# Patient Record
Sex: FEMALE | Race: WHITE | HISPANIC OR LATINO | Employment: FULL TIME | URBAN - METROPOLITAN AREA
[De-identification: names, ages, dates, MRNs, and addresses within clinical notes are randomized per-mention and may not be internally consistent; named-entity substitution may affect disease eponyms.]

---

## 2019-07-16 ENCOUNTER — HOSPITAL ENCOUNTER (OUTPATIENT)
Dept: RADIOLOGY | Facility: MEDICAL CENTER | Age: 24
End: 2019-07-16

## 2019-07-16 ENCOUNTER — HOSPITAL ENCOUNTER (INPATIENT)
Facility: MEDICAL CENTER | Age: 24
LOS: 5 days | DRG: 101 | End: 2019-07-21
Attending: EMERGENCY MEDICINE | Admitting: INTERNAL MEDICINE

## 2019-07-16 DIAGNOSIS — R51.9 ACUTE NONINTRACTABLE HEADACHE, UNSPECIFIED HEADACHE TYPE: ICD-10-CM

## 2019-07-16 DIAGNOSIS — R56.9 SEIZURE (HCC): ICD-10-CM

## 2019-07-16 PROBLEM — E87.20 LACTIC ACIDOSIS: Status: ACTIVE | Noted: 2019-07-16

## 2019-07-16 PROBLEM — E87.6 HYPOKALEMIA: Status: ACTIVE | Noted: 2019-07-16

## 2019-07-16 PROBLEM — D72.829 LEUKOCYTOSIS: Status: ACTIVE | Noted: 2019-07-16

## 2019-07-16 PROBLEM — N39.0 UTI (URINARY TRACT INFECTION): Status: ACTIVE | Noted: 2019-07-16

## 2019-07-16 LAB
ALBUMIN SERPL BCP-MCNC: 3.8 G/DL (ref 3.2–4.9)
ALBUMIN/GLOB SERPL: 1.2 G/DL
ALP SERPL-CCNC: 64 U/L (ref 30–99)
ALT SERPL-CCNC: 67 U/L (ref 2–50)
AMPHET UR QL SCN: NEGATIVE
ANION GAP SERPL CALC-SCNC: 10 MMOL/L (ref 0–11.9)
APPEARANCE UR: CLEAR
AST SERPL-CCNC: 42 U/L (ref 12–45)
BARBITURATES UR QL SCN: NEGATIVE
BASOPHILS # BLD AUTO: 0.3 % (ref 0–1.8)
BASOPHILS # BLD: 0.04 K/UL (ref 0–0.12)
BENZODIAZ UR QL SCN: NEGATIVE
BILIRUB SERPL-MCNC: 0.3 MG/DL (ref 0.1–1.5)
BILIRUB UR QL STRIP.AUTO: NEGATIVE
BUN SERPL-MCNC: 8 MG/DL (ref 8–22)
BZE UR QL SCN: NEGATIVE
CALCIUM SERPL-MCNC: 8 MG/DL (ref 8.5–10.5)
CANNABINOIDS UR QL SCN: NEGATIVE
CHLORIDE SERPL-SCNC: 108 MMOL/L (ref 96–112)
CO2 SERPL-SCNC: 18 MMOL/L (ref 20–33)
COLOR UR: YELLOW
CREAT SERPL-MCNC: 0.59 MG/DL (ref 0.5–1.4)
EOSINOPHIL # BLD AUTO: 0.01 K/UL (ref 0–0.51)
EOSINOPHIL NFR BLD: 0.1 % (ref 0–6.9)
ERYTHROCYTE [DISTWIDTH] IN BLOOD BY AUTOMATED COUNT: 39.5 FL (ref 35.9–50)
GLOBULIN SER CALC-MCNC: 3.2 G/DL (ref 1.9–3.5)
GLUCOSE SERPL-MCNC: 110 MG/DL (ref 65–99)
GLUCOSE UR STRIP.AUTO-MCNC: NEGATIVE MG/DL
HCG SERPL QL: NEGATIVE
HCT VFR BLD AUTO: 38.1 % (ref 37–47)
HGB BLD-MCNC: 12.8 G/DL (ref 12–16)
IMM GRANULOCYTES # BLD AUTO: 0.1 K/UL (ref 0–0.11)
IMM GRANULOCYTES NFR BLD AUTO: 0.6 % (ref 0–0.9)
KETONES UR STRIP.AUTO-MCNC: NEGATIVE MG/DL
LACTATE BLD-SCNC: 1 MMOL/L (ref 0.5–2)
LACTATE BLD-SCNC: 2.8 MMOL/L (ref 0.5–2)
LEUKOCYTE ESTERASE UR QL STRIP.AUTO: NEGATIVE
LYMPHOCYTES # BLD AUTO: 1.64 K/UL (ref 1–4.8)
LYMPHOCYTES NFR BLD: 10.3 % (ref 22–41)
MCH RBC QN AUTO: 28.1 PG (ref 27–33)
MCHC RBC AUTO-ENTMCNC: 33.6 G/DL (ref 33.6–35)
MCV RBC AUTO: 83.7 FL (ref 81.4–97.8)
METHADONE UR QL SCN: NEGATIVE
MICRO URNS: NORMAL
MONOCYTES # BLD AUTO: 0.89 K/UL (ref 0–0.85)
MONOCYTES NFR BLD AUTO: 5.6 % (ref 0–13.4)
NEUTROPHILS # BLD AUTO: 13.3 K/UL (ref 2–7.15)
NEUTROPHILS NFR BLD: 83.1 % (ref 44–72)
NITRITE UR QL STRIP.AUTO: NEGATIVE
NRBC # BLD AUTO: 0 K/UL
NRBC BLD-RTO: 0 /100 WBC
OPIATES UR QL SCN: NEGATIVE
OXYCODONE UR QL SCN: NEGATIVE
PCP UR QL SCN: NEGATIVE
PH UR STRIP.AUTO: 6.5 [PH]
PLATELET # BLD AUTO: 290 K/UL (ref 164–446)
PMV BLD AUTO: 10.7 FL (ref 9–12.9)
POTASSIUM SERPL-SCNC: 3.9 MMOL/L (ref 3.6–5.5)
PROPOXYPH UR QL SCN: NEGATIVE
PROT SERPL-MCNC: 7 G/DL (ref 6–8.2)
PROT UR QL STRIP: NEGATIVE MG/DL
RBC # BLD AUTO: 4.55 M/UL (ref 4.2–5.4)
RBC UR QL AUTO: NEGATIVE
SODIUM SERPL-SCNC: 136 MMOL/L (ref 135–145)
SP GR UR STRIP.AUTO: 1.01
UROBILINOGEN UR STRIP.AUTO-MCNC: 0.2 MG/DL
WBC # BLD AUTO: 16 K/UL (ref 4.8–10.8)

## 2019-07-16 PROCEDURE — A9270 NON-COVERED ITEM OR SERVICE: HCPCS | Performed by: INTERNAL MEDICINE

## 2019-07-16 PROCEDURE — 99255 IP/OBS CONSLTJ NEW/EST HI 80: CPT

## 2019-07-16 PROCEDURE — 700102 HCHG RX REV CODE 250 W/ 637 OVERRIDE(OP): Performed by: INTERNAL MEDICINE

## 2019-07-16 PROCEDURE — 83605 ASSAY OF LACTIC ACID: CPT | Mod: 91

## 2019-07-16 PROCEDURE — 700105 HCHG RX REV CODE 258: Performed by: INTERNAL MEDICINE

## 2019-07-16 PROCEDURE — 84703 CHORIONIC GONADOTROPIN ASSAY: CPT

## 2019-07-16 PROCEDURE — 99285 EMERGENCY DEPT VISIT HI MDM: CPT

## 2019-07-16 PROCEDURE — 80053 COMPREHEN METABOLIC PANEL: CPT

## 2019-07-16 PROCEDURE — 770020 HCHG ROOM/CARE - TELE (206)

## 2019-07-16 PROCEDURE — 87086 URINE CULTURE/COLONY COUNT: CPT

## 2019-07-16 PROCEDURE — 81003 URINALYSIS AUTO W/O SCOPE: CPT

## 2019-07-16 PROCEDURE — 87040 BLOOD CULTURE FOR BACTERIA: CPT | Mod: 91

## 2019-07-16 PROCEDURE — 99223 1ST HOSP IP/OBS HIGH 75: CPT | Performed by: INTERNAL MEDICINE

## 2019-07-16 PROCEDURE — 80307 DRUG TEST PRSMV CHEM ANLYZR: CPT

## 2019-07-16 PROCEDURE — 85025 COMPLETE CBC W/AUTO DIFF WBC: CPT

## 2019-07-16 RX ORDER — OXYCODONE HYDROCHLORIDE 5 MG/1
5 TABLET ORAL
Status: DISCONTINUED | OUTPATIENT
Start: 2019-07-16 | End: 2019-07-21 | Stop reason: HOSPADM

## 2019-07-16 RX ORDER — ACETAMINOPHEN 325 MG/1
650 TABLET ORAL EVERY 6 HOURS PRN
Status: DISCONTINUED | OUTPATIENT
Start: 2019-07-16 | End: 2019-07-21 | Stop reason: HOSPADM

## 2019-07-16 RX ORDER — SODIUM CHLORIDE 9 MG/ML
INJECTION, SOLUTION INTRAVENOUS CONTINUOUS
Status: DISCONTINUED | OUTPATIENT
Start: 2019-07-16 | End: 2019-07-19

## 2019-07-16 RX ORDER — PROMETHAZINE HYDROCHLORIDE 25 MG/1
12.5-25 TABLET ORAL EVERY 4 HOURS PRN
Status: DISCONTINUED | OUTPATIENT
Start: 2019-07-16 | End: 2019-07-21 | Stop reason: HOSPADM

## 2019-07-16 RX ORDER — BISACODYL 10 MG
10 SUPPOSITORY, RECTAL RECTAL
Status: DISCONTINUED | OUTPATIENT
Start: 2019-07-16 | End: 2019-07-21 | Stop reason: HOSPADM

## 2019-07-16 RX ORDER — ONDANSETRON 2 MG/ML
4 INJECTION INTRAMUSCULAR; INTRAVENOUS EVERY 4 HOURS PRN
Status: DISCONTINUED | OUTPATIENT
Start: 2019-07-16 | End: 2019-07-21 | Stop reason: HOSPADM

## 2019-07-16 RX ORDER — OXYCODONE HYDROCHLORIDE 5 MG/1
2.5 TABLET ORAL
Status: DISCONTINUED | OUTPATIENT
Start: 2019-07-16 | End: 2019-07-21 | Stop reason: HOSPADM

## 2019-07-16 RX ORDER — LEVETIRACETAM 500 MG/1
500 TABLET ORAL 2 TIMES DAILY
Status: DISCONTINUED | OUTPATIENT
Start: 2019-07-16 | End: 2019-07-21 | Stop reason: HOSPADM

## 2019-07-16 RX ORDER — PROMETHAZINE HYDROCHLORIDE 25 MG/1
12.5-25 SUPPOSITORY RECTAL EVERY 4 HOURS PRN
Status: DISCONTINUED | OUTPATIENT
Start: 2019-07-16 | End: 2019-07-21 | Stop reason: HOSPADM

## 2019-07-16 RX ORDER — AMOXICILLIN 250 MG
2 CAPSULE ORAL 2 TIMES DAILY
Status: DISCONTINUED | OUTPATIENT
Start: 2019-07-16 | End: 2019-07-21 | Stop reason: HOSPADM

## 2019-07-16 RX ORDER — ONDANSETRON 4 MG/1
4 TABLET, ORALLY DISINTEGRATING ORAL EVERY 4 HOURS PRN
Status: DISCONTINUED | OUTPATIENT
Start: 2019-07-16 | End: 2019-07-21 | Stop reason: HOSPADM

## 2019-07-16 RX ORDER — MORPHINE SULFATE 4 MG/ML
2 INJECTION, SOLUTION INTRAMUSCULAR; INTRAVENOUS
Status: DISCONTINUED | OUTPATIENT
Start: 2019-07-16 | End: 2019-07-21 | Stop reason: HOSPADM

## 2019-07-16 RX ORDER — POLYETHYLENE GLYCOL 3350 17 G/17G
1 POWDER, FOR SOLUTION ORAL
Status: DISCONTINUED | OUTPATIENT
Start: 2019-07-16 | End: 2019-07-21 | Stop reason: HOSPADM

## 2019-07-16 RX ADMIN — SENNOSIDES, DOCUSATE SODIUM 2 TABLET: 50; 8.6 TABLET, FILM COATED ORAL at 21:42

## 2019-07-16 RX ADMIN — LEVETIRACETAM 500 MG: 500 TABLET ORAL at 19:32

## 2019-07-16 RX ADMIN — SODIUM CHLORIDE: 9 INJECTION, SOLUTION INTRAVENOUS at 17:40

## 2019-07-16 RX ADMIN — SODIUM CHLORIDE: 9 INJECTION, SOLUTION INTRAVENOUS at 22:00

## 2019-07-16 ASSESSMENT — PATIENT HEALTH QUESTIONNAIRE - PHQ9
SUM OF ALL RESPONSES TO PHQ9 QUESTIONS 1 AND 2: 0
2. FEELING DOWN, DEPRESSED, IRRITABLE, OR HOPELESS: NOT AT ALL
1. LITTLE INTEREST OR PLEASURE IN DOING THINGS: NOT AT ALL

## 2019-07-16 ASSESSMENT — COGNITIVE AND FUNCTIONAL STATUS - GENERAL
PERSONAL GROOMING: A LITTLE
WALKING IN HOSPITAL ROOM: A LITTLE
SUGGESTED CMS G CODE MODIFIER MOBILITY: CK
DAILY ACTIVITIY SCORE: 18
EATING MEALS: A LITTLE
TOILETING: A LITTLE
HELP NEEDED FOR BATHING: A LITTLE
MOBILITY SCORE: 19
CLIMB 3 TO 5 STEPS WITH RAILING: A LITTLE
TURNING FROM BACK TO SIDE WHILE IN FLAT BAD: A LITTLE
DRESSING REGULAR UPPER BODY CLOTHING: A LITTLE
MOVING TO AND FROM BED TO CHAIR: A LITTLE
MOVING FROM LYING ON BACK TO SITTING ON SIDE OF FLAT BED: A LITTLE
SUGGESTED CMS G CODE MODIFIER DAILY ACTIVITY: CK
DRESSING REGULAR LOWER BODY CLOTHING: A LITTLE

## 2019-07-16 ASSESSMENT — ENCOUNTER SYMPTOMS
NERVOUS/ANXIOUS: 0
SENSORY CHANGE: 0
DIZZINESS: 0
CHILLS: 0
INSOMNIA: 0
SEIZURES: 1
CLAUDICATION: 0
PHOTOPHOBIA: 0
SHORTNESS OF BREATH: 0
MYALGIAS: 0
ABDOMINAL PAIN: 0
WEAKNESS: 0
DIARRHEA: 0
VOMITING: 0
NAUSEA: 0
HEARTBURN: 0
SORE THROAT: 0
LOSS OF CONSCIOUSNESS: 1
SPEECH CHANGE: 0
CONSTIPATION: 0
COUGH: 0
BLURRED VISION: 0
DEPRESSION: 0
HEADACHES: 1
FEVER: 0

## 2019-07-16 ASSESSMENT — LIFESTYLE VARIABLES
DO YOU DRINK ALCOHOL: NO
EVER_SMOKED: NEVER

## 2019-07-16 NOTE — ED PROVIDER NOTES
"ED Provider Note    CHIEF COMPLAINT  Chief Complaint   Patient presents with   • Seizure     Patient witnessed to have seizure at home today.  Taken to [place] where patient has additional seizure.   Hx of seizures \"years ago\".  Transferred for Neuro consult.           HPI  Rosanna Cid is a 24 y.o. female with a history of seizure disorder who presents on transfer from [facility] with complaints of headache and seizure.  The patient apparently has been having a headache for the past 5 days that have been very severe.  She describes a headache as bitemporal in nature, with throbbing and pressure.  She did have associated visual symptoms including blurry vision, sparkling lights, and tunnel vision.  She was seen in the emergency department in [place] yesterday and was given some medication with improvement of her symptoms.  Earlier this morning, the patient needed to have a headache, and had seizure-like activity and appeared to be postictal after.  She was taken to the outside hospital where he she was complaining of a severe headache.  CT scan head without contrast showed no acute findings.  She had a witnessed tonic clonic seizure lasting for about 1 minute.  She was given Ativan and loaded with Keppra.  Because of her headaches and seizure, a lumbar puncture was performed with results pending.  Her white blood cell count was elevated at 34,000, with a lactic acid of 6.2.  The patient was given 2 g of Rocephin, and 800 mg of acyclovir.  She was transferred to this facility for further care.  Apparently the patient had a similar episode in 2007 where she was having a severe headache and had a seizure.  She had an MRI which was unremarkable per the outside physician.  She has had no further seizures since that initial episode, until today.    REVIEW OF SYSTEMS  See HPI for further details. All other systems are negative.     PAST MEDICAL HISTORY  Past Medical History:   Diagnosis Date   • Seizure (HCC)  " "   last  one was 2 years ago, not on medication   patient denies seizure 2 years ago    FAMILY HISTORY  No family history on file.    SOCIAL HISTORY  Social History     Social History   • Marital status: Single     Spouse name: N/A   • Number of children: N/A   • Years of education: N/A     Social History Main Topics   • Smoking status: Never Smoker   • Smokeless tobacco: Not on file   • Alcohol use No   • Drug use: No   • Sexual activity: Not Currently     Partners: Male     Other Topics Concern   • Not on file     Social History Narrative   • No narrative on file       SURGICAL HISTORY  No past surgical history on file.    CURRENT MEDICATIONS  Home Medications     Reviewed by Kareen Henning R.N. (Registered Nurse) on 07/16/19 at 1458  Med List Status: Partial   Medication Last Dose Status   ferrous sulfate 325 (65 FE) MG EC tablet  Active                ALLERGIES  Allergies   Allergen Reactions   • Nkda [No Known Drug Allergy]        PHYSICAL EXAM  VITAL SIGNS: /67   Pulse (!) 102   Temp 36.9 °C (98.4 °F) (Temporal)   Resp 12   Ht 1.676 m (5' 6\")   Wt 86.2 kg (190 lb)   SpO2 99%   BMI 30.67 kg/m²   Constitutional: Awake, alert, mildly sedated appearing, in no acute distress, Non-toxic appearance.   HENT: Atraumatic. Bilateral external ears normal, mucous membranes mildly dry, throat nonerythematous without exudates, nose is normal.  Eyes: PERRL, EOMI, conjunctiva moist, noninjected.  Neck: Nontender, Normal range of motion, No nuchal rigidity, No stridor.   Lymphatic: No lymphadenopathy noted.   Cardiovascular: Regular rate and rhythm, no murmurs, rubs, gallops.  Thorax & Lungs:  Good breath sounds bilaterally, no wheezes, rales, or retractions.  No chest tenderness.  Abdomen: Bowel sounds normal, Soft, nontender, nondistended, no rebound, guarding, masses.  Back: No CVA or spinal tenderness.  Extremities: Intact distal pulses, No edema, No tenderness.   Skin: Warm, Dry, No rashes. "   Musculoskeletal: No joint swelling or tenderness.  Neurologic: Alert & oriented x 3, cranial nerves II through XII appear intact, speech is fluent, no facial asymmetry, sensory intact to light touch, and motor function shows 5/5 strength to the upper and lower extremities, no focal deficits.   Psychiatric: Affect normal, Judgment normal, Mood normal.       RADIOLOGY/PROCEDURES  OUTSIDE IMAGES-DX CHEST   Final Result      OUTSIDE IMAGES-CT HEAD   Final Result      MR-BRAIN-W/O    (Results Pending)       COURSE & MEDICAL DECISION MAKING  Pertinent Labs & Imaging studies reviewed. (See chart for details)  The patient presents with the above complaints.  She is currently awake, slightly sedated appearing, appears neurologically intact.  The patient complains of a mild headache, but declines any further pain medication.  Work-up from the outside hospital was reviewed.  The patient was noted to have an elevated white count and low CO2 and elevated lactic acid likely secondary to the seizure.  She did have a lumbar puncture and spinal fluid shows no signs of infection with 0 WBC, and normal glucose and protein.  Patient received IV fluids at the outside facility.  Repeat lab work here shows a white count of 16,000, 83% polys, CO2 of 18, anion gap 10, calcium 8, SGOT 42, SGPT 67, total bilirubin 0.3, lactic acid improved to 2.8.  hCG was negative.  Case was discussed with Dr. Macedo for admission.  Dr. Paz of neurology was consulted and will see the patient.        FINAL IMPRESSION  1.  Seizure  2.  Acute headache  3.         Electronically signed by: Alex Orozco, 7/16/2019 3:05 PM

## 2019-07-16 NOTE — H&P
Hospital Medicine History & Physical Note    Date of Service  7/16/2019    Primary Care Physician  Pcp Pt States None    Consultants  Neurology consult pending    Code Status  full    Chief Complaint  Seizure at OSH while presenting for uncontrolled headache    History of Presenting Illness  24 y.o. female who presented 7/16/2019 with severe, uncontrolled headache to [facility].  While being worked up in the ED she had tonic-clonic activity that lasted approximately 1 minute that resolved prior to receiving 1g Keppra and 1g Ativan.  She was transferred here for neurology consultation.  She had an LP prior to transfer but unfortunately the results of this test did not accompany the patient and attempts are underway to get this information.  Patient states same thing happened in 2007 and she states she never received a diagnosis at that time but hadn't had any issues with this until now.  Labs at OSH are consistent with acute reaction with WBC 34, K 2.8, CO2 9, lactic acid 6.2 and UA with 1+ leukocyte esterase, 2+ bacteria and negative nitrate.  Patient is alert and oriented at time of examination but sleepy.  CT head is without abnormality other than a small calcified lesion.    Review of Systems  Review of Systems   Constitutional: Positive for malaise/fatigue. Negative for chills and fever.   HENT: Negative for congestion and sore throat.    Eyes: Negative for blurred vision and photophobia.   Respiratory: Negative for cough and shortness of breath.    Cardiovascular: Negative for chest pain, claudication and leg swelling.   Gastrointestinal: Negative for abdominal pain, constipation, diarrhea, heartburn, nausea and vomiting.   Genitourinary: Positive for urgency. Negative for dysuria and hematuria.   Musculoskeletal: Negative for joint pain and myalgias.   Skin: Negative for itching and rash.   Neurological: Positive for seizures, loss of consciousness and headaches. Negative for dizziness, sensory change,  speech change and weakness.   Psychiatric/Behavioral: Negative for depression. The patient is not nervous/anxious and does not have insomnia.        Past Medical History   has a past medical history of Seizure ([facility]). She also has no past medical history of Addisons disease ([facility]); Adrenal disorder ([facility]); Allergy; Anemia; Anxiety; Arrhythmia; Arthritis; ASTHMA; Blood transfusion; Cancer ([facility]); CATARACT; CHF (congestive heart failure) ([facility]); Clotting disorder ([facility]); COPD; Cushings syndrome ([facility]); Depression; Diabetes; Diabetic neuropathy ([facility]); EMPHYSEMA; GERD (gastroesophageal reflux disease); Glaucoma; Goiter; Headache(784.0); Heart attack (HCC); Heart murmur; HIV (human immunodeficiency virus infection); Hyperlipidemia; Hypertension; IBD (inflammatory bowel disease); Kidney disease; Meningitis; Migraine; Muscle disorder; OSTEOPOROSIS; Parathyroid disorder (HCC); Pituitary disease (HCC); Sickle cell disease (HCC); Stroke ([facility]); Substance abuse ([facility]); Thyroid disease; Tuberculosis; Ulcer; or Urinary tract infection, site not specified.    Surgical History   has no past surgical history on file.   Denies previous surgeries    Family History  family history is not on file.   Denies significant family history    Social History   reports that she has never smoked. She does not have any smokeless tobacco history on file. She reports that she does not drink alcohol or use drugs.    Allergies  Allergies   Allergen Reactions   • Nkda [No Known Drug Allergy]        Medications  Prior to Admission Medications   Prescriptions Last Dose Informant Patient Reported? Taking?   asa/apap/caffeine (EXCEDRIN) 250-250-65 MG Tab 7/15/2019 at PRN Patient Yes Yes   Sig: Take 1 Tab by mouth every 6 hours as needed for Headache.      Facility-Administered Medications: None       Physical Exam  Temp:  [36.9 °C (98.4 °F)] 36.9 °C (98.4 °F)  Pulse:  [102] 102  Resp:  [12] 12  BP: (117)/(67) 117/67  SpO2:  [99 %] 99 %    Physical Exam    Constitutional: She is oriented to person, place, and time. She appears well-developed and well-nourished. No distress.   HENT:   Head: Normocephalic and atraumatic.   Eyes: Conjunctivae are normal. No scleral icterus.   Neck: Neck supple. No JVD present.   Cardiovascular: Normal rate, regular rhythm and normal heart sounds.  Exam reveals no gallop and no friction rub.    No murmur heard.  Pulmonary/Chest: Effort normal and breath sounds normal. No respiratory distress. She exhibits no tenderness.   Abdominal: Soft. Bowel sounds are normal. She exhibits no distension. There is no guarding.   Musculoskeletal: She exhibits no edema or tenderness.   Lymphadenopathy:     She has no cervical adenopathy.   Neurological: She is alert and oriented to person, place, and time. No cranial nerve deficit.   Skin: Skin is warm and dry. She is not diaphoretic. No erythema. No pallor.   Psychiatric: She has a normal mood and affect. Her behavior is normal.   Nursing note and vitals reviewed.      Laboratory:  Recent Labs      07/16/19   1507   WBC  16.0*   RBC  4.55   HEMOGLOBIN  12.8   HEMATOCRIT  38.1   MCV  83.7   MCH  28.1   MCHC  33.6   RDW  39.5   PLATELETCT  290   MPV  10.7         Recent Labs      07/16/19   1507   ALTSGPT  67*   ASTSGOT  42   ALKPHOSPHAT  64   TBILIRUBIN  0.3   GLUCOSE  110*                 No results for input(s): TROPONINI in the last 72 hours.    Urinalysis:    No results found     Imaging:  OUTSIDE IMAGES-DX CHEST   Final Result      OUTSIDE IMAGES-CT HEAD   Final Result      MR-BRAIN-W/O    (Results Pending)         Assessment/Plan:  I anticipate this patient will require at least two midnights for appropriate medical management, necessitating inpatient admission.    * Seizure (HCC)   Assessment & Plan    Neurology consulted by ERP - Brandi  Loaded keppra at OSH along with rocephin - continue oral at 500mg bid  Ativan PRN seizure, seizure precautions  LP completed at OSH - results received, no  evidence of infection  Continue empiric rocephin, add vanco           Leukocytosis   Assessment & Plan    Secondary to seizure and UTI  Empiric antibiotics.       Hypokalemia   Assessment & Plan    2.8 at OSH  Replete as needed       Lactic acidosis   Assessment & Plan    Secondary to seizure and dehydration  Improved from 6.2 at OSH to 2.8 here  Continue hydration       UTI (urinary tract infection)   Assessment & Plan    Rocephin       Headache   Assessment & Plan    Preceded seizure by 2 days  Suspect seizure-like activity could be complex migraine  PRN pain medication           VTE prophylaxis: scds

## 2019-07-16 NOTE — ED TRIAGE NOTES
"Chief Complaint   Patient presents with   • Seizure     Patient witnessed to have seizure at home today.  Taken to [place] where patient has additional seizure.   Hx of seizures \"years ago\".  Transferred for Neuro consult.           "

## 2019-07-16 NOTE — CONSULTS
Consulted for headache and multiple seizures by ED Dr Tamara Johnson     HPI:  25 yo woman with acute onset left sided throbbing headache associated with sensitivity to light and sound which started last week on a Tuesday ( 1 week ago). She had daily headaches which were preventing her from sleeping and waking her up from sleep. She used to have similar headaches when she was younger however never multiple episodes all day every day. She was taking OTC acetaminophen and paracetamol to relieve 10/10 pain however this was very brief and pain would come back. She reports no vision loss, no blurry vision or diplopia, no fevers, night sweats and no weight loss or sick contacts.  Family reports no odd behavior, no hallucinations or delusions.  She suddenly developed GTC x 2 yesterday during the day and she had additional 2 or 3 episodes today one while in the outside hospital where she received Ativan and a load of LEV. GTC seizures were full body convulsions with lateral tongue bite and postictal confusion lasting 15-20 minutes.  She had LP at [facility] and was immmediately transferred here because of ongoing seizure activity.  Family and patient tell me that she has exact similar episode with one seizure in 2008 before which she also had headache.  She is currently fatigued and slightly postictal however back to baseline and answering questions and following all commands.  She had no seizures in her childhood and there is no family history of either seizures or migraines.  There were no sick contacts, rashes, travels out of the country recently.  LP results from [facility] are pending at this time.  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  Her brother and parents are with her today.  Apparently no PMH and takes no medications.    ROS:   Constitutional: No fevers or chills.  Eyes: No  "blurry vision or eye pain.  ENT: No dysphagia or hearing loss.  Respiratory: No cough or shortness of breath.  Cardiovascular: No chest pain or palpitations.  GI: No nausea, vomiting, or diarrhea.  : No urinary incontinence or dysuria.  Musculoskeletal: No joint swelling or arthralgias.  Skin: No skin rashes.  Neuro: as above + seizures + headache  Endocrine: No heat or cold intolerance. No polydipsia or polyuria.  Psych: No depression or anxiety.  Heme/Lymph: No easy bruising or swollen lymph nodes.      Past Medical History:  Past Medical History:   Diagnosis Date   • Seizure (HCC)     last  one was 2 years ago, not on medication       Past Surgical History:   No past surgical history on file.    Social History:   Social History     Social History   • Marital status: Single     Spouse name: N/A   • Number of children: N/A   • Years of education: N/A     Occupational History   • Not on file.     Social History Main Topics   • Smoking status: Never Smoker   • Smokeless tobacco: Not on file   • Alcohol use No   • Drug use: No   • Sexual activity: Not Currently     Partners: Male     Other Topics Concern   • Not on file     Social History Narrative   • No narrative on file       Family History:   No family history on file.  No significant FH of neurological disorders.    Allergies:   Nkda [no known drug allergy]    No current facility-administered medications on file prior to encounter.      No current outpatient prescriptions on file prior to encounter.     Physical Exam:     Encounter Vitals  Standard Vitals  Vitals  Blood Pressure: 117/67  Temperature: 36.9 °C (98.4 °F)  Temp src: Temporal  Pulse: (!) 102  Respiration: 12  Pulse Oximetry: 99 %  Height: 167.6 cm (5' 6\")  Weight: 86.2 kg (190 lb)  Encounter Vitals  Temperature: 36.9 °C (98.4 °F)  Temp src: Temporal  Blood Pressure: 117/67  BP Location: Right  Patient BP Position: Supine  Pulse: (!) 102  Respiration: 12  Pulse Oximetry: 99 %  O2 (LPM): 2  O2 " "Delivery: Nasal Cannula  Weight: 86.2 kg (190 lb)  Weight Source: Other Healthcare Provider  Height: 167.6 cm (5' 6\")  BMI (Calculated): 30.67  Pulmonary-Specific Vitals     Durable Medical Equipment-Specific Vitals  DME  O2 (LPM): 2  O2 Delivery: Nasal Cannula      Constitutional: Well-developed, well-nourished, good hygiene. Appears stated age.  Cardiovascular: RRR, with no murmurs, rubs or gallops. No carotid bruits.   Respiratory: Lungs CTA B/L, no W/R/R.   Abdomen: Soft Non-tender to Palpation. Non-distended.  Extremities: No peripheral edema, pedal pulses intact.   Skin: Warm, dry, intact. No rashes observed.  Eyes: Sclera anicteric   Funduscopic: Optic discs flat with no evidence of papilledema or pallor.   Neurologic:   Mental Status: somnolent but opens eyes to voice and follows all commands    Speech: Fluent with normal prosody..    Concentration: Attentive. Able to focus on history and follow multi-step commands.              Fund of Knowledge: Appropriate   Cranial Nerves:    CN II: PERRL. No afferent pupillary defect.    CN III, IV, VI: Good eye closure, EOMI.     CN V: Facial sensation intact and symmetric.     CN VII: No facial asymmetry.     CN VIII: Hearing intact.     CN IX and X: Palate elevates symmetrically. Normal gag reflex.    CN XI: Symmetric shoulder shrug.     CN XII: Tongue midline.   Neck - moving freely no stiffness  Kernig and brudzinski negative    Sensory: Intact light touch, vibration and temperature.    Coordination: No evidence of past-pointing on finger to nose testing, no dysdiadochokinesia. Heel to shin intact.             DTR's: 2+ throughout without clonus.    Babinski: Toes downgoing bilaterally.   Romberg: Negative.   Movements: No tremors observed.   Musculoskeletal:    Strength: 5/5 in upper and lower extremities bilaterally.   Gait: deferred, patient postictal    Tone: Normal bulk and tone.   Joints: No swelling.     Labs:  Pending   Pending CSF labs from OSH   Pending " UA and UDS    Imaging:   CT head NAF  MRI brain 2008- unremarkable       Impression and plan  Migraine headaches followed  By multiple seizures   Unlikely familial hemiplegic migraines as presentation not typical   Migralepsy?  Can nor r/o CNS infection such as infectious meningitis or encephalitis   Viral infection would be more likely as she has no features of bacterial meningitis and does not appear toxic   Had similar episode in 2008 which makes infection less likely as a cause   Autoimmune encephalitis is a possibility however she lacks behavioral/cognitive symptoms     Plan:  MRI brain with and without contrast to r/o structural lesion   cEEG with video monitoring    mg bid   Ativan 1-2 mg prn if any further GTCs  Obtain CSF results - need OP,CP, protein,glucose, OCB,MBP, viral panel PCR EBV,ESV,VZV,enteroviruses EEE WEE  Lyme  ACE  Would send ESR,CRP,DIONE, RF, APS  Infectious workup including UA CXR blood cultures etc  Magnesium sulfate 1 g /24h IV  Symptomatic management of migraines- toradol IM or IV 60 mg q8h prn for 1-2 days only  Empiric coverage for HSV encephalitis   Add autoimmune encephalitis panel to CSF if possible including NMDA and VGKC  Given one episode in 2008 already doubt paraneoplastic etiology of her current symptoms   Consider preventative migraine treatment such as Topiramate 25 mg qweek and increase by 25q week until 100 mg bid or until sx better controlled  B2 400 mg daily Mag oxide 500 mg daily at home   Can try triptans as abortive migraine medication   Seizure precautions   No driving for 3 months after seizure    Thank you for this consult. Neurology will continue to follow.    Asia Paz MD PhD   Board certified neurologist   Behavioral Neurologist

## 2019-07-17 ENCOUNTER — APPOINTMENT (OUTPATIENT)
Dept: RADIOLOGY | Facility: MEDICAL CENTER | Age: 24
DRG: 101 | End: 2019-07-17

## 2019-07-17 PROBLEM — R82.81 PYURIA: Status: ACTIVE | Noted: 2019-07-16

## 2019-07-17 LAB
ALBUMIN SERPL BCP-MCNC: 3.7 G/DL (ref 3.2–4.9)
ALBUMIN/GLOB SERPL: 1.3 G/DL
ALP SERPL-CCNC: 55 U/L (ref 30–99)
ALT SERPL-CCNC: 60 U/L (ref 2–50)
ANION GAP SERPL CALC-SCNC: 9 MMOL/L (ref 0–11.9)
AST SERPL-CCNC: 37 U/L (ref 12–45)
BILIRUB SERPL-MCNC: 0.5 MG/DL (ref 0.1–1.5)
BUN SERPL-MCNC: 6 MG/DL (ref 8–22)
CALCIUM SERPL-MCNC: 8.4 MG/DL (ref 8.5–10.5)
CHLORIDE SERPL-SCNC: 108 MMOL/L (ref 96–112)
CO2 SERPL-SCNC: 22 MMOL/L (ref 20–33)
CREAT SERPL-MCNC: 0.63 MG/DL (ref 0.5–1.4)
EKG IMPRESSION: NORMAL
ERYTHROCYTE [DISTWIDTH] IN BLOOD BY AUTOMATED COUNT: 41 FL (ref 35.9–50)
GLOBULIN SER CALC-MCNC: 2.8 G/DL (ref 1.9–3.5)
GLUCOSE SERPL-MCNC: 90 MG/DL (ref 65–99)
HCT VFR BLD AUTO: 36 % (ref 37–47)
HGB BLD-MCNC: 11.8 G/DL (ref 12–16)
MCH RBC QN AUTO: 28.2 PG (ref 27–33)
MCHC RBC AUTO-ENTMCNC: 32.8 G/DL (ref 33.6–35)
MCV RBC AUTO: 85.9 FL (ref 81.4–97.8)
PLATELET # BLD AUTO: 256 K/UL (ref 164–446)
PMV BLD AUTO: 11 FL (ref 9–12.9)
POTASSIUM SERPL-SCNC: 3.6 MMOL/L (ref 3.6–5.5)
PROT SERPL-MCNC: 6.5 G/DL (ref 6–8.2)
RBC # BLD AUTO: 4.19 M/UL (ref 4.2–5.4)
SODIUM SERPL-SCNC: 139 MMOL/L (ref 135–145)
WBC # BLD AUTO: 9.7 K/UL (ref 4.8–10.8)

## 2019-07-17 PROCEDURE — 93005 ELECTROCARDIOGRAM TRACING: CPT | Performed by: FAMILY MEDICINE

## 2019-07-17 PROCEDURE — 93010 ELECTROCARDIOGRAM REPORT: CPT | Performed by: INTERNAL MEDICINE

## 2019-07-17 PROCEDURE — 80053 COMPREHEN METABOLIC PANEL: CPT

## 2019-07-17 PROCEDURE — 770020 HCHG ROOM/CARE - TELE (206)

## 2019-07-17 PROCEDURE — 95951 EEG: CPT | Mod: 26,52 | Performed by: PSYCHIATRY & NEUROLOGY

## 2019-07-17 PROCEDURE — 700111 HCHG RX REV CODE 636 W/ 250 OVERRIDE (IP): Performed by: INTERNAL MEDICINE

## 2019-07-17 PROCEDURE — A9585 GADOBUTROL INJECTION: HCPCS

## 2019-07-17 PROCEDURE — 85027 COMPLETE CBC AUTOMATED: CPT

## 2019-07-17 PROCEDURE — 95951 EEG: CPT | Mod: 52 | Performed by: PSYCHIATRY & NEUROLOGY

## 2019-07-17 PROCEDURE — 99232 SBSQ HOSP IP/OBS MODERATE 35: CPT | Mod: 25

## 2019-07-17 PROCEDURE — 99232 SBSQ HOSP IP/OBS MODERATE 35: CPT | Performed by: FAMILY MEDICINE

## 2019-07-17 PROCEDURE — 700117 HCHG RX CONTRAST REV CODE 255

## 2019-07-17 PROCEDURE — A9270 NON-COVERED ITEM OR SERVICE: HCPCS | Performed by: INTERNAL MEDICINE

## 2019-07-17 PROCEDURE — 700102 HCHG RX REV CODE 250 W/ 637 OVERRIDE(OP): Performed by: INTERNAL MEDICINE

## 2019-07-17 PROCEDURE — 700105 HCHG RX REV CODE 258: Performed by: INTERNAL MEDICINE

## 2019-07-17 PROCEDURE — 70553 MRI BRAIN STEM W/O & W/DYE: CPT

## 2019-07-17 PROCEDURE — 36415 COLL VENOUS BLD VENIPUNCTURE: CPT

## 2019-07-17 PROCEDURE — 4A10X4Z MONITORING OF CENTRAL NERVOUS ELECTRICAL ACTIVITY, EXTERNAL APPROACH: ICD-10-PCS | Performed by: PSYCHIATRY & NEUROLOGY

## 2019-07-17 RX ORDER — GADOBUTROL 604.72 MG/ML
10 INJECTION INTRAVENOUS ONCE
Status: COMPLETED | OUTPATIENT
Start: 2019-07-17 | End: 2019-07-17

## 2019-07-17 RX ADMIN — GADOBUTROL 10 ML: 604.72 INJECTION INTRAVENOUS at 23:03

## 2019-07-17 RX ADMIN — SENNOSIDES, DOCUSATE SODIUM 2 TABLET: 50; 8.6 TABLET, FILM COATED ORAL at 04:54

## 2019-07-17 RX ADMIN — LEVETIRACETAM 500 MG: 500 TABLET ORAL at 16:35

## 2019-07-17 RX ADMIN — SODIUM CHLORIDE: 9 INJECTION, SOLUTION INTRAVENOUS at 19:09

## 2019-07-17 RX ADMIN — CEFTRIAXONE SODIUM 1 G: 1 INJECTION, POWDER, FOR SOLUTION INTRAMUSCULAR; INTRAVENOUS at 04:54

## 2019-07-17 RX ADMIN — SODIUM CHLORIDE: 9 INJECTION, SOLUTION INTRAVENOUS at 05:00

## 2019-07-17 RX ADMIN — LEVETIRACETAM 500 MG: 500 TABLET ORAL at 04:54

## 2019-07-17 ASSESSMENT — ENCOUNTER SYMPTOMS
VOMITING: 0
DIZZINESS: 0
SEIZURES: 0
NERVOUS/ANXIOUS: 0
COUGH: 0
FLANK PAIN: 0
ABDOMINAL PAIN: 0
WHEEZING: 0
DIARRHEA: 0
CHILLS: 0
SPEECH CHANGE: 0
SENSORY CHANGE: 0
SHORTNESS OF BREATH: 0
FOCAL WEAKNESS: 0
HEARTBURN: 0
NECK PAIN: 0
WEAKNESS: 0
BLURRED VISION: 0
HEADACHES: 0
PALPITATIONS: 0
BACK PAIN: 0
FEVER: 0
SORE THROAT: 0
NAUSEA: 0

## 2019-07-17 NOTE — CARE PLAN
Problem: Safety  Goal: Will remain free from injury  Outcome: PROGRESSING AS EXPECTED  Seizure precautions in place. Call light within reach.

## 2019-07-17 NOTE — ED NOTES
Attempted to call report on pt to neuro surgery. Per  RN would be candie. Attempted to transfer me to his extension x2 with no answer. Pt went transported to the floor.

## 2019-07-17 NOTE — PROGRESS NOTES
Pt admitted from ED. She is alert and oriented times four. Denies pain or needs at this time. Admission protocols completed. Seizure precautions implemented. Plan of care reviewed with pt and family, they voiced understanding. Call light and personal items within reach. Will monitor closely.    0215am Per tele technician, pt has had two pauses 1.2 secs and 1.4 secs. Pt was found to be asymptomatic on both occassions. Vitals normal, will monitor.    0600am: No acute events overnight. Has been seizure free since arrival to the floor. She is                ambulating, tolerating diet and cooperative with cares. Will monitor.    0620 am : Monitor Summary: Sinus iesha, Sinus rhythm. HR 47 to 81. MT .12, QRS .4, had two pauses lasting 1.2 secs and 1,4 secs respectively.

## 2019-07-17 NOTE — ED NOTES
Assumed care of pt at this time. Pt waiting for bed upstairs. Denies any needs at this time. Family at bedside. Call light within reach.

## 2019-07-17 NOTE — PROCEDURES
VIDEO ELECTROENCEPHALOGRAM REPORT      Referring provider: Dr. Paz.     DOS: 7/17/2019 (total recording of 28 minutes).     INDICATION:  Rosanna Cid 24 y.o. female presenting with headache and seizures.    CURRENT ANTIEPILEPTIC REGIMEN: Levetiracetam 500 mg twice daily.    TECHNIQUE: 30 channel video electroencephalogram (EEG) was performed in accordance with the international 10-20 system. The study was reviewed in bipolar and referential montages. The recording examined the patient during wakeful and drowsy/sleep state(s).     DESCRIPTION OF THE RECORD:  During the wakefulness, the background showed a symmetrical 10 hz alpha activity posteriorly with amplitude of 70 mV.  There was reactivity to eye closure/opening.  A normal anterior-posterior gradient was noted with faster beta frequencies seen anteriorly.  During drowsiness, theta/delta frequencies were seen.    During the sleep state, background shows diffuse high-amplitude 4-5 Hz delta activity.  Symmetrical high-amplitude sleep spindles and vertex sharps were seen in the leads over the central regions.     ACTIVATION PROCEDURES:   Hyperventilation was performed by the patient for a total of 3 minutes. The technician performing the test noted good effort. This technique failed to produce electroencephalographic changes.     Intermittent Photic stimulation was performed in a stepwise fashion from 1 to 30 Hz and elicited a normal response (photic driving), most noticeable in the posterior leads.      ICTAL AND/OR INTERICTAL FINDINGS:   Intermittent left temporal slowing, and rare left temporal sharps noted.  No clinical events or seizures were reported or recorded during the study.     EKG: sampling of the EKG recording demonstrated sinus rhythm.     EVENTS: None.    INTERPRETATION:  This is an abnormal video EEG recording in the awake, drowsy, sleep states. Intermittent left temporal slowing, and rare left temporal sharps noted.  The findings increase  risk for seizures, and suggests underlying area of cortical irritability and structural abnormality.  No seizures were captured during the study.  Clinical and radiological correlation is recommended.      Tai Rabago MD   Epilepsy and Neurodiagnostics.   Clinical  of Neurology [facility].   Diplomate in Neurology, Epilepsy, and Electrodiagnostic Medicine.   Office: [phone]  Fax: [phone]

## 2019-07-17 NOTE — PROGRESS NOTES
Neurology Daily Progress Note    Date of Service  7/17/2019    Chief Complaint  24 y.o. female admitted 7/16/2019 with headaches of migrainous character and multiple seizures.  She is currently seizure and migraine free since admitting to [facility] and doing well.Able to follow all commands and respond appropriately.    Hospital Course  As above     Interval Problem Update  Seizure free and headache free since arriving in ED    Code Status  Full code     Disposition  Floor status     Review of Systems  ROS   No headache  Fatigue+  Rest of systems negative     Physical Exam  Temp:  [36.3 °C (97.3 °F)-36.9 °C (98.5 °F)] 36.6 °C (97.9 °F)  Pulse:  [] 58  Resp:  [12-17] 17  BP: (110-154)/(67-92) 154/92  SpO2:  [95 %-100 %] 95 %    Neurologic Exam     Constitutional: Well-developed, well-nourished, good hygiene. Appears stated age.  Cardiovascular: RRR, with no murmurs, rubs or gallops. No carotid bruits.   Respiratory: Lungs CTA B/L, no W/R/R.   Abdomen: Soft Non-tender to Palpation. Non-distended.  Extremities: No peripheral edema, pedal pulses intact.   Skin: Warm, dry, intact. No rashes observed.  Eyes: Sclera anicteric              Funduscopic: Optic discs flat with no evidence of papilledema or pallor.   Neurologic:              Mental Status: somnolent but opens eyes to voice and follows all commands               Speech: Fluent with normal prosody..               Concentration: Attentive. Able to focus on history and follow multi-step commands.                         Fund of Knowledge: Appropriate              Cranial Nerves:                          CN II: PERRL. No afferent pupillary defect.                          CN III, IV, VI: Good eye closure, EOMI.                           CN V: Facial sensation intact and symmetric.                           CN VII: No facial asymmetry.                           CN VIII: Hearing intact.                           CN IX and X: Palate elevates symmetrically. Normal  gag reflex.                          CN XI: Symmetric shoulder shrug.                           CN XII: Tongue midline.   Neck - moving freely no stiffness  Kernig and brudzinski negative               Sensory: Intact light touch, vibration and temperature.               Coordination: No evidence of past-pointing on finger to nose testing, no dysdiadochokinesia. Heel to shin intact.                        DTR's: 2+ throughout without clonus.               Babinski: Toes downgoing bilaterally.              Romberg: Negative.              Movements: No tremors observed.   Musculoskeletal:               Strength: 5/5 in upper and lower extremities bilaterally.              Gait: deferred, patient postictal               Tone: Normal bulk and tone.              Joints: No swelling.   Fluids    Intake/Output Summary (Last 24 hours) at 07/17/19 1154  Last data filed at 07/17/19 0500   Gross per 24 hour   Intake             1206 ml   Output                0 ml   Net             1206 ml       Laboratory  Recent Labs      07/16/19   1507  07/17/19   0621   WBC  16.0*  9.7   RBC  4.55  4.19*   HEMOGLOBIN  12.8  11.8*   HEMATOCRIT  38.1  36.0*   MCV  83.7  85.9   MCH  28.1  28.2   MCHC  33.6  32.8*   RDW  39.5  41.0   PLATELETCT  290  256   MPV  10.7  11.0     Recent Labs      07/16/19   1507  07/17/19   0621   SODIUM  136  139   POTASSIUM  3.9  3.6   CHLORIDE  108  108   CO2  18*  22   GLUCOSE  110*  90   BUN  8  6*   CREATININE  0.59  0.63   CALCIUM  8.0*  8.4*                   Current Facility-Administered Medications:   •  NS  •  cefTRIAXone (ROCEPHIN) IV  •  senna-docusate **AND** polyethylene glycol/lytes **AND** magnesium hydroxide **AND** bisacodyl  •  acetaminophen  •  Notify provider if pain remains uncontrolled **AND** Use the numeric rating scale (NRS-11) on regular floors and Critical-Care Pain Observation Tool (CPOT) on ICUs/Trauma to assess pain **AND** Pulse Ox (Oximetry) **AND** Pharmacy Consult Request  **AND** If patient difficult to arouse and/or has respiratory depression, stop any opiates that are currently infusing and call a Rapid Response. **AND** oxyCODONE immediate-release **AND** oxyCODONE immediate-release **AND** morphine injection  •  ondansetron  •  ondansetron  •  promethazine  •  promethazine  •  prochlorperazine  •  levETIRAcetam    Labs:  Pending   Pending CSF labs from OSH   Pending UA and UDS     Imaging:   CT head NAF  MRI brain 2008- unremarkable         Impression and plan  Migraine headaches followed  By multiple seizures   Unlikely familial hemiplegic migraines as presentation not typical   Migralepsy?  Can nor r/o CNS infection such as infectious meningitis or encephalitis   Viral infection would be more likely as she has no features of bacterial meningitis and does not appear toxic   Had similar episode in 2008 which makes infection less likely as a cause   Autoimmune encephalitis is a possibility however she lacks behavioral/cognitive symptoms      Plan:  MRI brain with and without contrast to r/o structural lesion pending   cEEG with video monitoring pending    mg bid continue - she is seizure free for now   Ativan 1-2 mg prn if any further GTCs  Obtain CSF results - need OP,CP, protein,glucose, OCB,MBP, viral panel PCR EBV,ESV,VZV,enteroviruses EEE WEE  Lyme  ACE  Would send ESR,CRP,DIONE, RF, APS  Infectious workup including UA CXR blood cultures etc  Magnesium sulfate 1 g /24h IV for headaches if needed   Symptomatic management of migraines- toradol IM or IV 60 mg q8h prn for 1-2 days only  Empiric coverage for HSV encephalitis   Add autoimmune encephalitis panel to CSF if possible including NMDA and VGKC  Given one episode in 2008 already doubt paraneoplastic etiology of her current symptoms   Consider preventative migraine treatment such as Topiramate 25 mg qweek and increase by 25q week until 100 mg bid or until sx better controlled  B2 400 mg daily Mag oxide 500 mg daily at  home   Can try triptans as abortive migraine medication   Seizure precautions   No driving for 3 months after seizure

## 2019-07-17 NOTE — PROGRESS NOTES
2 RN Skin assessment completed with JOSE G Donald. Bruise/abrasion noted on right foot. Sacrum is intact and blanchable. Pt is ambulatory and turns herself well. Will monitor.

## 2019-07-18 ENCOUNTER — APPOINTMENT (OUTPATIENT)
Dept: RADIOLOGY | Facility: MEDICAL CENTER | Age: 24
DRG: 101 | End: 2019-07-18

## 2019-07-18 PROBLEM — R79.89 ELEVATED LFTS: Status: ACTIVE | Noted: 2019-07-18

## 2019-07-18 LAB
BACTERIA UR CULT: NORMAL
BURR CELLS/RBC NFR CSF MANUAL: 0 %
CLARITY CSF: CLEAR
COLOR CSF: COLORLESS
COLOR SPUN CSF: COLORLESS
CRP SERPL HS-MCNC: 0.41 MG/DL (ref 0–0.75)
ERYTHROCYTE [SEDIMENTATION RATE] IN BLOOD BY WESTERGREN METHOD: 15 MM/HOUR (ref 0–20)
ERYTHROCYTE [SEDIMENTATION RATE] IN BLOOD BY WESTERGREN METHOD: 21 MM/HOUR (ref 0–20)
EST. AVERAGE GLUCOSE BLD GHB EST-MCNC: 120 MG/DL
FOLATE SERPL-MCNC: 12.5 NG/ML
GLUCOSE CSF-MCNC: 56 MG/DL (ref 40–80)
GRAM STN SPEC: NORMAL
HAV IGM SERPL QL IA: NEGATIVE
HBA1C MFR BLD: 5.8 % (ref 0–5.6)
HBV CORE IGM SER QL: NEGATIVE
HBV SURFACE AG SER QL: NEGATIVE
HCV AB SER QL: NEGATIVE
HIV 1+2 AB+HIV1 P24 AG SERPL QL IA: NON REACTIVE
INDIA INK PREP CSF: NORMAL
LYMPHOCYTES NFR CSF: 95 %
MONONUC CELLS NFR CSF: 5 %
PROT CSF-MCNC: 31 MG/DL (ref 15–45)
RBC # CSF: 6 CELLS/UL
RHEUMATOID FACT SER IA-ACNC: <10 IU/ML (ref 0–14)
SIGNIFICANT IND 70042: NORMAL
SITE SITE: NORMAL
SOURCE SOURCE: NORMAL
SPECIMEN VOL CSF: 16 ML
TREPONEMA PALLIDUM IGG+IGM AB [PRESENCE] IN SERUM OR PLASMA BY IMMUNOASSAY: NON REACTIVE
TUBE # CSF: 3
TUBE # CSF: 4
VIT B12 SERPL-MCNC: 687 PG/ML (ref 211–911)
VIT B12 SERPL-MCNC: 710 PG/ML (ref 211–911)
WBC # CSF: 3 CELLS/UL (ref 0–10)

## 2019-07-18 PROCEDURE — 86140 C-REACTIVE PROTEIN: CPT

## 2019-07-18 PROCEDURE — 36415 COLL VENOUS BLD VENIPUNCTURE: CPT

## 2019-07-18 PROCEDURE — 99232 SBSQ HOSP IP/OBS MODERATE 35: CPT | Mod: 25

## 2019-07-18 PROCEDURE — 80074 ACUTE HEPATITIS PANEL: CPT

## 2019-07-18 PROCEDURE — 84160 ASSAY OF PROTEIN ANY SOURCE: CPT

## 2019-07-18 PROCEDURE — 86255 FLUORESCENT ANTIBODY SCREEN: CPT

## 2019-07-18 PROCEDURE — 83036 HEMOGLOBIN GLYCOSYLATED A1C: CPT

## 2019-07-18 PROCEDURE — 86788 WEST NILE VIRUS AB IGM: CPT

## 2019-07-18 PROCEDURE — 86038 ANTINUCLEAR ANTIBODIES: CPT

## 2019-07-18 PROCEDURE — 85652 RBC SED RATE AUTOMATED: CPT

## 2019-07-18 PROCEDURE — 87206 SMEAR FLUORESCENT/ACID STAI: CPT

## 2019-07-18 PROCEDURE — 82784 ASSAY IGA/IGD/IGG/IGM EACH: CPT | Mod: 91

## 2019-07-18 PROCEDURE — 84165 PROTEIN E-PHORESIS SERUM: CPT

## 2019-07-18 PROCEDURE — 700105 HCHG RX REV CODE 258: Performed by: FAMILY MEDICINE

## 2019-07-18 PROCEDURE — 82746 ASSAY OF FOLIC ACID SERUM: CPT

## 2019-07-18 PROCEDURE — 87205 SMEAR GRAM STAIN: CPT

## 2019-07-18 PROCEDURE — 87102 FUNGUS ISOLATION CULTURE: CPT

## 2019-07-18 PROCEDURE — 99232 SBSQ HOSP IP/OBS MODERATE 35: CPT | Performed by: FAMILY MEDICINE

## 2019-07-18 PROCEDURE — 770020 HCHG ROOM/CARE - TELE (206)

## 2019-07-18 PROCEDURE — 86431 RHEUMATOID FACTOR QUANT: CPT

## 2019-07-18 PROCEDURE — 83516 IMMUNOASSAY NONANTIBODY: CPT | Mod: 91

## 2019-07-18 PROCEDURE — 82040 ASSAY OF SERUM ALBUMIN: CPT

## 2019-07-18 PROCEDURE — 82042 OTHER SOURCE ALBUMIN QUAN EA: CPT

## 2019-07-18 PROCEDURE — 700102 HCHG RX REV CODE 250 W/ 637 OVERRIDE(OP): Performed by: INTERNAL MEDICINE

## 2019-07-18 PROCEDURE — 83916 OLIGOCLONAL BANDS: CPT

## 2019-07-18 PROCEDURE — 82164 ANGIOTENSIN I ENZYME TEST: CPT

## 2019-07-18 PROCEDURE — 86780 TREPONEMA PALLIDUM: CPT

## 2019-07-18 PROCEDURE — 700105 HCHG RX REV CODE 258: Performed by: INTERNAL MEDICINE

## 2019-07-18 PROCEDURE — 86235 NUCLEAR ANTIGEN ANTIBODY: CPT

## 2019-07-18 PROCEDURE — 84425 ASSAY OF VITAMIN B-1: CPT

## 2019-07-18 PROCEDURE — 83873 ASSAY OF CSF PROTEIN: CPT

## 2019-07-18 PROCEDURE — 009U3ZX DRAINAGE OF SPINAL CANAL, PERCUTANEOUS APPROACH, DIAGNOSTIC: ICD-10-PCS

## 2019-07-18 PROCEDURE — 87389 HIV-1 AG W/HIV-1&-2 AB AG IA: CPT

## 2019-07-18 PROCEDURE — A9270 NON-COVERED ITEM OR SERVICE: HCPCS | Performed by: INTERNAL MEDICINE

## 2019-07-18 PROCEDURE — 87116 MYCOBACTERIA CULTURE: CPT

## 2019-07-18 PROCEDURE — 86592 SYPHILIS TEST NON-TREP QUAL: CPT

## 2019-07-18 PROCEDURE — 89051 BODY FLUID CELL COUNT: CPT

## 2019-07-18 PROCEDURE — 86789 WEST NILE VIRUS ANTIBODY: CPT

## 2019-07-18 PROCEDURE — 700117 HCHG RX CONTRAST REV CODE 255

## 2019-07-18 PROCEDURE — 82945 GLUCOSE OTHER FLUID: CPT

## 2019-07-18 PROCEDURE — 71260 CT THORAX DX C+: CPT

## 2019-07-18 PROCEDURE — 87483 CNS DNA AMP PROBE TYPE 12-25: CPT

## 2019-07-18 PROCEDURE — 86147 CARDIOLIPIN ANTIBODY EA IG: CPT

## 2019-07-18 PROCEDURE — 87070 CULTURE OTHR SPECIMN AEROBIC: CPT

## 2019-07-18 PROCEDURE — 84207 ASSAY OF VITAMIN B-6: CPT

## 2019-07-18 PROCEDURE — 700111 HCHG RX REV CODE 636 W/ 250 OVERRIDE (IP): Performed by: FAMILY MEDICINE

## 2019-07-18 PROCEDURE — 82607 VITAMIN B-12: CPT

## 2019-07-18 PROCEDURE — 700101 HCHG RX REV CODE 250: Performed by: STUDENT IN AN ORGANIZED HEALTH CARE EDUCATION/TRAINING PROGRAM

## 2019-07-18 PROCEDURE — 84157 ASSAY OF PROTEIN OTHER: CPT

## 2019-07-18 RX ADMIN — SODIUM CHLORIDE: 9 INJECTION, SOLUTION INTRAVENOUS at 05:48

## 2019-07-18 RX ADMIN — IOHEXOL 100 ML: 350 INJECTION, SOLUTION INTRAVENOUS at 18:31

## 2019-07-18 RX ADMIN — IOHEXOL 50 ML: 240 INJECTION, SOLUTION INTRATHECAL; INTRAVASCULAR; INTRAVENOUS; ORAL at 18:31

## 2019-07-18 RX ADMIN — LEVETIRACETAM 500 MG: 500 TABLET ORAL at 05:45

## 2019-07-18 RX ADMIN — LIDOCAINE HYDROCHLORIDE 20 ML: 10 INJECTION, SOLUTION INFILTRATION; PERINEURAL at 13:30

## 2019-07-18 RX ADMIN — CEFTRIAXONE SODIUM 1 G: 1 INJECTION, POWDER, FOR SOLUTION INTRAMUSCULAR; INTRAVENOUS at 05:45

## 2019-07-18 RX ADMIN — LEVETIRACETAM 500 MG: 500 TABLET ORAL at 17:14

## 2019-07-18 RX ADMIN — SENNOSIDES, DOCUSATE SODIUM 2 TABLET: 50; 8.6 TABLET, FILM COATED ORAL at 17:13

## 2019-07-18 RX ADMIN — SENNOSIDES, DOCUSATE SODIUM 2 TABLET: 50; 8.6 TABLET, FILM COATED ORAL at 05:45

## 2019-07-18 ASSESSMENT — ENCOUNTER SYMPTOMS
NAUSEA: 0
SPEECH CHANGE: 0
SORE THROAT: 0
HEARTBURN: 0
VOMITING: 0
DIARRHEA: 0
COUGH: 0
SEIZURES: 0
NERVOUS/ANXIOUS: 0
WHEEZING: 0
DIZZINESS: 0
SENSORY CHANGE: 0
BLURRED VISION: 0
HEADACHES: 0
WEAKNESS: 0
PALPITATIONS: 0
NECK PAIN: 0
FEVER: 0
CHILLS: 0
FOCAL WEAKNESS: 0
FLANK PAIN: 0
SHORTNESS OF BREATH: 0
ABDOMINAL PAIN: 0
BACK PAIN: 0

## 2019-07-18 NOTE — PROGRESS NOTES
Assumed pt care at 1900. Bedside report received from JOSE G Mccullough. Pt resting comfortably in bed, family at bedside. Medication regimen and POC discussed with pt and family. Pt A&Ox4, MITCHELL, PERRL, and follows commands. No c/o pain, SOB, N/V/D. Universal fall and seizure precautions in place. Call light within reach. No needs at this time.     Pt down to MRI via wheelchair at 2240. Monitor room notified.

## 2019-07-18 NOTE — DISCHARGE PLANNING
Anticipated Discharge Disposition: Home    Action: Pt was discussed during rounds. Pt is not medically clear. Pt will have no needs.    LSW attempted to met with pt bedside for assessment. Pt was being seen by Neurology. LSW will attempt at a later time.    Barriers to Discharge: Medical Clearance    Plan: LSW to f/u with pt

## 2019-07-18 NOTE — PROGRESS NOTES
Monitor summary: SB-SR 53-80, WA 0.16, QRS 0.08, QT 0.40, with 1.3 sec pause per strip from monitor room.

## 2019-07-18 NOTE — PROGRESS NOTES
Monitor summary: SB-SR 52-96, NH .18, QRS .08, QT .44, with a rare 1.2-1.3 second pause and a HR as low as 45 per strip from monitor room.

## 2019-07-18 NOTE — PROGRESS NOTES
Hospital Medicine Daily Progress Note    Date of Service  7/18/2019    Chief Complaint  24 y.o. female admitted 7/16/2019 with Seizure    Hospital Course  Admitted with Seizure, presented initially as Headache.    Interval Problem Update  Seizure - EEG showed Intermittent left temporal slowing, and rare left temporal sharps noted, MRI showed there are multifocal abnormal T2 hyperintensities in the subcortical and periventricular white matter. There is no abnormal contrast enhancement. This is new since the previous MRI dated 2/8/2008 These findings likely represents chronic demyelinating plaques  Headache - no recurrence  Lactic acidosis - trended down  Pyuria - culture pending    Consultants/Specialty  Neurology    Code Status  Full    Disposition  TBD    Review of Systems  Review of Systems   Constitutional: Negative for chills, fever and malaise/fatigue.   HENT: Negative for hearing loss and sore throat.    Eyes: Negative for blurred vision.   Respiratory: Negative for cough, shortness of breath and wheezing.    Cardiovascular: Negative for chest pain, palpitations and leg swelling.   Gastrointestinal: Negative for abdominal pain, diarrhea, heartburn, nausea and vomiting.   Genitourinary: Negative for dysuria, flank pain and hematuria.   Musculoskeletal: Negative for back pain and neck pain.   Skin: Negative for rash.   Neurological: Negative for dizziness, sensory change, speech change, focal weakness, seizures, weakness and headaches.   Psychiatric/Behavioral: The patient is not nervous/anxious.         Physical Exam  Temp:  [36.5 °C (97.7 °F)-37 °C (98.6 °F)] 36.6 °C (97.8 °F)  Pulse:  [66-75] 73  Resp:  [15-18] 15  BP: (111-139)/(73-83) 123/76  SpO2:  [93 %-99 %] 93 %    Physical Exam   Constitutional: She is oriented to person, place, and time. She appears well-developed and well-nourished.   HENT:   Head: Normocephalic and atraumatic.   Eyes: Pupils are equal, round, and reactive to light. Conjunctivae are  normal.   Neck: No tracheal deviation present. No thyromegaly present.   Cardiovascular: Normal rate and regular rhythm.    Pulmonary/Chest: Effort normal and breath sounds normal.   Abdominal: Soft. Bowel sounds are normal. She exhibits no distension. There is no tenderness.   Musculoskeletal: She exhibits no edema.   Lymphadenopathy:     She has no cervical adenopathy.   Neurological: She is alert and oriented to person, place, and time. No cranial nerve deficit.   MMT 5/5   Skin: Skin is warm and dry.   Nursing note and vitals reviewed.      Fluids    Intake/Output Summary (Last 24 hours) at 07/18/19 1447  Last data filed at 07/18/19 1200   Gross per 24 hour   Intake              470 ml   Output                0 ml   Net              470 ml       Laboratory  Recent Labs      07/16/19   1507  07/17/19   0621   WBC  16.0*  9.7   RBC  4.55  4.19*   HEMOGLOBIN  12.8  11.8*   HEMATOCRIT  38.1  36.0*   MCV  83.7  85.9   MCH  28.1  28.2   MCHC  33.6  32.8*   RDW  39.5  41.0   PLATELETCT  290  256   MPV  10.7  11.0     Recent Labs      07/16/19   1507  07/17/19   0621   SODIUM  136  139   POTASSIUM  3.9  3.6   CHLORIDE  108  108   CO2  18*  22   GLUCOSE  110*  90   BUN  8  6*   CREATININE  0.59  0.63   CALCIUM  8.0*  8.4*                   Imaging  MR-BRAIN-WITH & W/O   Final Result      1.  There are multifocal abnormal T2 hyperintensities in the subcortical and periventricular white matter. There is no abnormal contrast enhancement. This is new since the previous MRI dated 2/8/2008 These findings likely represents chronic demyelinating    plaques.   2.  Stable an approximately 6 mm sized parenchymal calcification in the left occipital lobe likely representing chronic sequelae previous granulomatous infection. This is stable since the previous MRI dated 2/8/2008.      OUTSIDE IMAGES-DX CHEST   Final Result      OUTSIDE IMAGES-CT HEAD   Final Result      CT-CHEST,ABDOMEN,PELVIS WITH    (Results Pending)         Assessment/Plan  * Seizure (HCC)- (present on admission)   Assessment & Plan    Keppra  For repeat LP  For CT chest/abd/pelvis     Leukocytosis- (present on admission)   Assessment & Plan    Follow cbc       Elevated LFTs- (present on admission)   Assessment & Plan    Follow cmp  For CT abd/pelvis  Check Hepatitis panel     Lactic acidosis- (present on admission)   Assessment & Plan    Stop IVF hydration       Pyuria- (present on admission)   Assessment & Plan    Finished course of Rocephin       Headache- (present on admission)   Assessment & Plan    For repeat LP     Hypokalemia- (present on admission)   Assessment & Plan    Follow cmp            VTE prophylaxis: SCD

## 2019-07-18 NOTE — CARE PLAN
Problem: Communication  Goal: The ability to communicate needs accurately and effectively will improve  Outcome: PROGRESSING AS EXPECTED  Pt A&Ox4, calls appropriately using call light and is able to make needs known to staff.     Problem: Safety  Goal: Will remain free from injury  Outcome: PROGRESSING AS EXPECTED  Seizure precautions in place. Pt educated on seizure precaution rationale, pt verbalized understanding. Pt is a SBA with a steady gait.

## 2019-07-18 NOTE — PROCEDURES
I supervised the residents while performing the procedure.          INDICATION: Seizures, abnormal MRI findings     PROCEDURE : Dr Jag Alanis     ATTENDING PHYSICIAN: Dr Paz     CONSENT:   Consent was obtained from Ms Cid prior to the procedure. Indications, risks, and benefits were explained at length.      PROCEDURE SUMMARY:   A time-out was performed. Hands were washed immediately prior to the procedure. Sterile technique was used throughout the procedure. The patient was placed in the right fetal position with help from the nursing staff. The area was cleansed and draped in usual sterile fashion using betadine scrub. Anesthesia was achieved with 1% lidocaine. A 20-gauge 3.5-inch spinal needle was placed in the L3/4 lumbar interspace. On the 1st attempt, clear colored cerebral spinal fluid was obtained. The opening pressure was not obtained. 14 cc of CSF was collected into 4 tubes. These were sent for testing, including 1 tube to be held for further analysis if needed. A sterile bandaid was placed over the puncture site. The patient had no immediate complications and tolerated the procedure well.  Estimated blood loss was <1 cc.

## 2019-07-18 NOTE — PROGRESS NOTES
Neurology Daily Progress Note    Date of Service  7/18/2019    Chief Complaint  24 y.o. female admitted 7/16/2019 with headache and multiple seizures.  She is currently headache and seizure free and reports that all her symptoms had resolved.      Hospital Course  She is significantly improved since admission. No further seizures and no headache currently.    Interval Problem Update      Code Status  Full code     Disposition  Floor, potential d/c today    Review of Systems  ROS   No pertinent positives   Slight fatigue as she could not sleep well     Physical Exam  Temp:  [36.5 °C (97.7 °F)-37 °C (98.6 °F)] 36.9 °C (98.4 °F)  Pulse:  [66-76] 72  Resp:  [16-18] 16  BP: (111-139)/(73-86) 125/74  SpO2:  [94 %-99 %] 96 %    Neurologic Exam  Neurologic Exam      Constitutional: Well-developed, well-nourished, good hygiene. Appears stated age.  Cardiovascular: RRR, with no murmurs, rubs or gallops. No carotid bruits.   Respiratory: Lungs CTA B/L, no W/R/R.   Abdomen: Soft Non-tender to Palpation. Non-distended.  Extremities: No peripheral edema, pedal pulses intact.   Skin: Warm, dry, intact. No rashes observed.  Eyes: Sclera anicteric              Funduscopic: Optic discs flat with no evidence of papilledema or pallor.   Neurologic:              Mental Status: somnolent but opens eyes to voice and follows all commands               Speech: Fluent with normal prosody..               Concentration: Attentive. Able to focus on history and follow multi-step commands.                         Fund of Knowledge: Appropriate              Cranial Nerves:                          CN II: PERRL. No afferent pupillary defect.                          CN III, IV, VI: Good eye closure, EOMI.                           CN V: Facial sensation intact and symmetric.                           CN VII: No facial asymmetry.                           CN VIII: Hearing intact.                           CN IX and X: Palate elevates symmetrically.  Normal gag reflex.                          CN XI: Symmetric shoulder shrug.                           CN XII: Tongue midline.   Neck - moving freely no stiffness  Kernig and brudzinski negative               Sensory: Intact light touch, vibration and temperature.               Coordination: No evidence of past-pointing on finger to nose testing, no dysdiadochokinesia. Heel to shin intact.                        DTR's: 2+ throughout without clonus.               Babinski: Toes downgoing bilaterally.              Romberg: Negative.              Movements: No tremors observed.   Musculoskeletal:               Strength: 5/5 in upper and lower extremities bilaterally.              Gait: deferred, patient postictal               Tone: Normal bulk and tone.              Joints: No swelling.     Fluids    Intake/Output Summary (Last 24 hours) at 07/18/19 0908  Last data filed at 07/18/19 0800   Gross per 24 hour   Intake              360 ml   Output                0 ml   Net              360 ml       Laboratory  Recent Labs      07/16/19   1507  07/17/19   0621   WBC  16.0*  9.7   RBC  4.55  4.19*   HEMOGLOBIN  12.8  11.8*   HEMATOCRIT  38.1  36.0*   MCV  83.7  85.9   MCH  28.1  28.2   MCHC  33.6  32.8*   RDW  39.5  41.0   PLATELETCT  290  256   MPV  10.7  11.0     Recent Labs      07/16/19   1507  07/17/19   0621   SODIUM  136  139   POTASSIUM  3.9  3.6   CHLORIDE  108  108   CO2  18*  22   GLUCOSE  110*  90   BUN  8  6*   CREATININE  0.59  0.63   CALCIUM  8.0*  8.4*                   Imaging    MRI brain      EEG     Referring provider: Dr. Paz.      DOS: 7/17/2019 (total recording of 28 minutes).      INDICATION:  Rosanna Cid 24 y.o. female presenting with headache and seizures.     CURRENT ANTIEPILEPTIC REGIMEN: Levetiracetam 500 mg twice daily.     TECHNIQUE: 30 channel video electroencephalogram (EEG) was performed in accordance with the international 10-20 system. The study was reviewed in bipolar and  referential montages. The recording examined the patient during wakeful and drowsy/sleep state(s).      DESCRIPTION OF THE RECORD:  During the wakefulness, the background showed a symmetrical 10 hz alpha activity posteriorly with amplitude of 70 mV.  There was reactivity to eye closure/opening.  A normal anterior-posterior gradient was noted with faster beta frequencies seen anteriorly.  During drowsiness, theta/delta frequencies were seen.     During the sleep state, background shows diffuse high-amplitude 4-5 Hz delta activity.  Symmetrical high-amplitude sleep spindles and vertex sharps were seen in the leads over the central regions.      ACTIVATION PROCEDURES:   Hyperventilation was performed by the patient for a total of 3 minutes. The technician performing the test noted good effort. This technique failed to produce electroencephalographic changes.      Intermittent Photic stimulation was performed in a stepwise fashion from 1 to 30 Hz and elicited a normal response (photic driving), most noticeable in the posterior leads.        ICTAL AND/OR INTERICTAL FINDINGS:   Intermittent left temporal slowing, and rare left temporal sharps noted.  No clinical events or seizures were reported or recorded during the study.      EKG: sampling of the EKG recording demonstrated sinus rhythm.      EVENTS: None.     INTERPRETATION:  This is an abnormal video EEG recording in the awake, drowsy, sleep states. Intermittent left temporal slowing, and rare left temporal sharps noted.  The findings increase risk for seizures      Assessment/Plan  Persistent headaches and multiple seizure like episodes  Two similar episodes of headaches followed by seizures   Migralepsy remains a possibility   From ED- her basic LP labs were reported as normal however we have no official reports from OSH  Outside records still pending at this time   She is improved to the point where all her symptoms had subsided and she currently has no headache  and did not have a seizure since admission.  EEG does sow intermittent left temporal slowing and rare temporal sharps indicating increase risk for seizures coming from left tempora lobe.  Continue Keppra 500 mg bid   MRI brain with and without contrast no enhancement but with new multifocal T2 hyper intensities in subcortical and periventricular white matter new since 2008 MRI.  L occipital calcification - possibly sequela of prior infection with calcification.  Unclear diagnosis and includes demyelinating disease, autoimmune encephalitis, chronic infection vs paraneoplastic.  Will proceed with LP - send for protein, glucose, cytology,differential, opening pressure closing pressure, ACE, oligoclonal bands, MBP,IgG index, paraneoplastic panel,NMDA Ab, AMPA, LGI1, anti AVELINA B anti AVELINA A anti Caspr-Ab   Paraneoplastic panel serum   Pan CT chest abdomen pelvis to r/o cancer  Will await CSF and likely start IVIG     We will continue to follow.    Asia Paz MD PhD   Board Certified Neurologist         VTE prophylaxis: Chemical

## 2019-07-19 PROBLEM — R93.0 ABNORMAL MRI OF HEAD: Status: ACTIVE | Noted: 2019-07-19

## 2019-07-19 LAB
ACE SERPL-CCNC: 11 U/L (ref 9–67)
ALBUMIN SERPL BCP-MCNC: 3.9 G/DL (ref 3.2–4.9)
ALBUMIN/GLOB SERPL: 1.2 G/DL
ALP SERPL-CCNC: 68 U/L (ref 30–99)
ALT SERPL-CCNC: 95 U/L (ref 2–50)
ANION GAP SERPL CALC-SCNC: 9 MMOL/L (ref 0–11.9)
AST SERPL-CCNC: 54 U/L (ref 12–45)
BASOPHILS # BLD AUTO: 0.5 % (ref 0–1.8)
BASOPHILS # BLD: 0.06 K/UL (ref 0–0.12)
BILIRUB SERPL-MCNC: 0.6 MG/DL (ref 0.1–1.5)
BUN SERPL-MCNC: 10 MG/DL (ref 8–22)
CALCIUM SERPL-MCNC: 9.3 MG/DL (ref 8.5–10.5)
CHLORIDE SERPL-SCNC: 102 MMOL/L (ref 96–112)
CO2 SERPL-SCNC: 26 MMOL/L (ref 20–33)
CREAT SERPL-MCNC: 0.77 MG/DL (ref 0.5–1.4)
CYTOLOGY REG CYTOL: NORMAL
EOSINOPHIL # BLD AUTO: 0.16 K/UL (ref 0–0.51)
EOSINOPHIL NFR BLD: 1.3 % (ref 0–6.9)
ERYTHROCYTE [DISTWIDTH] IN BLOOD BY AUTOMATED COUNT: 38.8 FL (ref 35.9–50)
GLOBULIN SER CALC-MCNC: 3.2 G/DL (ref 1.9–3.5)
GLUCOSE SERPL-MCNC: 103 MG/DL (ref 65–99)
HCT VFR BLD AUTO: 40.4 % (ref 37–47)
HGB BLD-MCNC: 13.8 G/DL (ref 12–16)
IMM GRANULOCYTES # BLD AUTO: 0.06 K/UL (ref 0–0.11)
IMM GRANULOCYTES NFR BLD AUTO: 0.5 % (ref 0–0.9)
LYMPHOCYTES # BLD AUTO: 3.38 K/UL (ref 1–4.8)
LYMPHOCYTES NFR BLD: 27.4 % (ref 22–41)
MCH RBC QN AUTO: 28.6 PG (ref 27–33)
MCHC RBC AUTO-ENTMCNC: 34.2 G/DL (ref 33.6–35)
MCV RBC AUTO: 83.8 FL (ref 81.4–97.8)
MONOCYTES # BLD AUTO: 0.79 K/UL (ref 0–0.85)
MONOCYTES NFR BLD AUTO: 6.4 % (ref 0–13.4)
NEUTROPHILS # BLD AUTO: 7.87 K/UL (ref 2–7.15)
NEUTROPHILS NFR BLD: 63.9 % (ref 44–72)
NRBC # BLD AUTO: 0 K/UL
NRBC BLD-RTO: 0 /100 WBC
PLATELET # BLD AUTO: 298 K/UL (ref 164–446)
PMV BLD AUTO: 10.7 FL (ref 9–12.9)
POTASSIUM SERPL-SCNC: 3.8 MMOL/L (ref 3.6–5.5)
PROT SERPL-MCNC: 7.1 G/DL (ref 6–8.2)
RBC # BLD AUTO: 4.82 M/UL (ref 4.2–5.4)
RHODAMINE-AURAMINE STN SPEC: NORMAL
SIGNIFICANT IND 70042: NORMAL
SITE SITE: NORMAL
SODIUM SERPL-SCNC: 137 MMOL/L (ref 135–145)
SOURCE SOURCE: NORMAL
WBC # BLD AUTO: 12.3 K/UL (ref 4.8–10.8)

## 2019-07-19 PROCEDURE — 86341 ISLET CELL ANTIBODY: CPT

## 2019-07-19 PROCEDURE — 99232 SBSQ HOSP IP/OBS MODERATE 35: CPT | Performed by: FAMILY MEDICINE

## 2019-07-19 PROCEDURE — 36415 COLL VENOUS BLD VENIPUNCTURE: CPT

## 2019-07-19 PROCEDURE — 83519 RIA NONANTIBODY: CPT

## 2019-07-19 PROCEDURE — 700105 HCHG RX REV CODE 258: Performed by: FAMILY MEDICINE

## 2019-07-19 PROCEDURE — 85025 COMPLETE CBC W/AUTO DIFF WBC: CPT

## 2019-07-19 PROCEDURE — A9270 NON-COVERED ITEM OR SERVICE: HCPCS | Performed by: INTERNAL MEDICINE

## 2019-07-19 PROCEDURE — 700111 HCHG RX REV CODE 636 W/ 250 OVERRIDE (IP): Performed by: FAMILY MEDICINE

## 2019-07-19 PROCEDURE — 770020 HCHG ROOM/CARE - TELE (206)

## 2019-07-19 PROCEDURE — 88108 CYTOPATH CONCENTRATE TECH: CPT

## 2019-07-19 PROCEDURE — 700102 HCHG RX REV CODE 250 W/ 637 OVERRIDE(OP): Performed by: INTERNAL MEDICINE

## 2019-07-19 PROCEDURE — 86255 FLUORESCENT ANTIBODY SCREEN: CPT | Mod: 91

## 2019-07-19 PROCEDURE — 80053 COMPREHEN METABOLIC PANEL: CPT

## 2019-07-19 RX ADMIN — SENNOSIDES, DOCUSATE SODIUM 2 TABLET: 50; 8.6 TABLET, FILM COATED ORAL at 05:06

## 2019-07-19 RX ADMIN — LEVETIRACETAM 500 MG: 500 TABLET ORAL at 05:06

## 2019-07-19 RX ADMIN — SODIUM CHLORIDE 1000 MG: 9 INJECTION, SOLUTION INTRAVENOUS at 10:15

## 2019-07-19 RX ADMIN — LEVETIRACETAM 500 MG: 500 TABLET ORAL at 17:26

## 2019-07-19 ASSESSMENT — ENCOUNTER SYMPTOMS
BACK PAIN: 0
CHILLS: 0
FOCAL WEAKNESS: 0
BLURRED VISION: 0
SPEECH CHANGE: 0
COUGH: 0
NERVOUS/ANXIOUS: 0
NECK PAIN: 0
SENSORY CHANGE: 0
HEARTBURN: 0
VOMITING: 0
WHEEZING: 0
SEIZURES: 0
PALPITATIONS: 0
SORE THROAT: 0
FLANK PAIN: 0
ABDOMINAL PAIN: 0
DIZZINESS: 0
NAUSEA: 0
FEVER: 0
WEAKNESS: 0
DIARRHEA: 0
SHORTNESS OF BREATH: 0
HEADACHES: 0

## 2019-07-19 NOTE — PROGRESS NOTES
Patient maintained flat position in bed from 8070-1320 per protocol.  No drainage from LP site noted.

## 2019-07-19 NOTE — PROGRESS NOTES
Neurology Progress Note:    Christi Rm  Date & Time note created:    7/19/2019   8:06 AM     Patient ID:  Name:             Rosanna Cid    YOB: 1995  Age:                 24 y.o.  female  MRN:               [id number]                                                               Chief Complaint(s):      Headache and seizures    History of Present Illness:    This is a very pleasant 24 y.o. healthy female admitted on 7/16 with headache and seizures that have resolved/not recurred since admission. She was initially taken to [facility] where an LP was performed to rule out acute meningitis, cell counts were normal. She was transferred to this facility for further workup.    MRI showed multifocal abnormal T2 hyperintensities in subcortical and periventricular while mater, no abnormal contrast enhancement, findings representing chronic demyelinating plaques which is new compared to MRI in 2008. Additionally, 6 mm parenchymal calcification in left occipital lobe representing prior granulomatous infection.      EEG showed intermittent left temporal slowing, rare left temporal sharps, findings suggesting increased seizure risk.     Interval History (past 24 hours) :   No further seizures, headaches resolved.   Repeat LP with viral and autoimmune panel performed on 7/18/19, cell counts normal with lymphocytosis.   CT TAP 7/18 negative for neoplasia.     Review of Systems:      Constitutional: Denies fevers, weight changes  Eyes: Denies changes in vision, jaundice  Ears/Nose/Throat/Mouth: Denies nasal congestion or sore throat   Cardiovascular: Denies chest pain or palpitations   Respiratory: Denies shortness of breath, dyspnea, wheeze  Gastrointestinal : Denies abdominal pain, nausea, vomiting, diarrhea, constipation or GI bleeding   Genitourinary: Denies dysuria or frequency  Musculoskeletal/Rheum: Denies  joint pain and swelling, edema  Skin: Denies rash  Neurological: Denies headache, confusion,  memory loss or focal weakness/parasthesias  Psychiatric: denies mood disorder   Endocrine: Isabella thyroid problems  Heme/Oncology/Lymph Nodes: Denies enlarged lymph nodes, denies brusing or known bleeding disorder  All other systems were reviewed and are negative (AMA/CMS criteria)                Past Medical History:   Past Medical History:   Diagnosis Date   • Seizure (HCC)     last  one was 2 years ago, not on medication     Active Hospital Problems    Diagnosis   • Seizure (HCC) [R56.9]     Priority: High   • Leukocytosis [D72.829]     Priority: High   • Elevated LFTs [R94.5]     Priority: Medium   • Headache [R51]     Priority: Medium   • Pyuria [N39.0]     Priority: Medium   • Lactic acidosis [E87.2]     Priority: Medium   • Hypokalemia [E87.6]       Past Surgical History:  History reviewed. No pertinent surgical history.    Hospital Medications:  Current Facility-Administered Medications   Medication Dose Frequency Provider Last Rate Last Dose   • methylPREDNISolone sod succ (SOLU-MEDROL) 1,000 mg in  mL IVPB  1 g DAILY Usman Boss M.D.       • senna-docusate (PERICOLACE or SENOKOT S) 8.6-50 MG per tablet 2 Tab  2 Tab BID RANDY Chavis.O.   2 Tab at 07/19/19 0506    And   • polyethylene glycol/lytes (MIRALAX) PACKET 1 Packet  1 Packet QDAY PRN RANDY Chavis.O.        And   • magnesium hydroxide (MILK OF MAGNESIA) suspension 30 mL  30 mL QDAY PRN WILLIAM ChavisO.        And   • bisacodyl (DULCOLAX) suppository 10 mg  10 mg QDAY PRN RANDY Chavis.O.       • acetaminophen (TYLENOL) tablet 650 mg  650 mg Q6HRS PRN RANDY Chavis.O.       • Pharmacy Consult Request ...Pain Management Review 1 Each  1 Each PHARMACY TO DOSE Marianna Macedo D.O.        And   • oxyCODONE immediate-release (ROXICODONE) tablet 2.5 mg  2.5 mg Q3HRS PRN WILLIAM ChavisO.        And   • oxyCODONE immediate-release (ROXICODONE) tablet 5 mg  5 mg Q3HRS PRN RANDY Chavis.O.        And   • morphine (pf) 4 mg/ml  "injection 2 mg  2 mg Q3HRS PRN RANDY Chavis.O.       • ondansetron (ZOFRAN) syringe/vial injection 4 mg  4 mg Q4HRS PRN Marianna Macedo D.O.       • ondansetron (ZOFRAN ODT) dispertab 4 mg  4 mg Q4HRS PRN RANDY Chavis.O.       • promethazine (PHENERGAN) tablet 12.5-25 mg  12.5-25 mg Q4HRS PRN RANDY Chavis.O.       • promethazine (PHENERGAN) suppository 12.5-25 mg  12.5-25 mg Q4HRS PRN RANDY Chavis.O.       • prochlorperazine (COMPAZINE) injection 5-10 mg  5-10 mg Q4HRS PRN RANDY Chavis.O.       • levETIRAcetam (KEPPRA) tablet 500 mg  500 mg BID RANDY Chavis.O.   500 mg at 07/19/19 0506     Last reviewed on 7/16/2019 11:14 PM by Alejandro Grace R.N.    Current Outpatient Medications:  Prescriptions Prior to Admission   Medication Sig Dispense Refill Last Dose   • asa/apap/caffeine (EXCEDRIN) 250-250-65 MG Tab Take 1 Tab by mouth every 6 hours as needed for Headache.   7/15/2019 at [facility]       Medication Allergy:  Allergies   Allergen Reactions   • Nkda [No Known Drug Allergy]        Physical Exam:  Weight/BMI: Body mass index is 33.98 kg/m².  /74   Pulse 81   Temp 36.8 °C (98.3 °F) (Temporal)   Resp 17   Ht 1.676 m (5' 6\")   Wt 95.5 kg (210 lb 8.6 oz)   SpO2 97%   Vitals:    07/18/19 1600 07/18/19 2000 07/19/19 0000 07/19/19 0400   BP: 128/85 141/87 117/73 112/74   Pulse: 74 89 82 81   Resp: 16 16 16 17   Temp: 37.1 °C (98.7 °F) 37.3 °C (99.1 °F) 36.6 °C (97.8 °F) 36.8 °C (98.3 °F)   TempSrc: Temporal Temporal Temporal Temporal   SpO2: 95% 97% 93% 97%   Weight:       Height:         Oxygen Therapy:  Pulse Oximetry: 97 %, O2 (LPM): 0, O2 Delivery: None (Room Air)    Intake/Output Summary (Last 24 hours) at 07/19/19 0806  Last data filed at 07/18/19 1200   Gross per 24 hour   Intake              110 ml   Output                0 ml   Net              110 ml       Constitutional:   Well developed, well nourished, no acute distress  HEENT:  Normocephalic, Atraumatic, Conjunctiva not pale, " Sclera not icteric, Oropharynx moist mucous membranes, No oral exudates, Nose normal.  No thyromegaly.  Neck:  Normal range of motion, No cervical tenderness,  no JVD.  Chest/Lungs:  Symmetric expansion, no spider angioma, breath sounds clear to auscultation bilaterally,  no crackles, no wheezing.   Cardiovascular:  Normal heart rate, Normal rhythm, No murmurs, No rubs, No gallops.    Abdomen: Bowel sounds normal, Soft, No tenderness, No guarding, No rebound, No masses, No hepatosplenomegaly.  Extremities: No cyanosis/clubbing/edema/palmar erythema/flapping tremor  Neuro : No focal motor or sensory deficits. CN II-XII normal.   Skin: Warm, Dry, No erythema, No rash, no induration.      MDM (Data Review):     Records reviewed and summarized in current documentation    Lab Data Review:  Recent Results (from the past 24 hour(s))   WESTERGREN SED RATE    Collection Time: 07/18/19 12:02 PM   Result Value Ref Range    Sed Rate Westergren 15 0 - 20 mm/hour   WESTERGREN SED RATE    Collection Time: 07/18/19 12:02 PM   Result Value Ref Range    Sed Rate Westergren 21 (H) 0 - 20 mm/hour   HIV AG/AB COMBO ASSAY SCREENING    Collection Time: 07/18/19 12:02 PM   Result Value Ref Range    HIV Ag/Ab Combo Assay Non Reactive Non Reactive   HEPATITIS PANEL ACUTE(4 COMPONENTS)    Collection Time: 07/18/19 12:02 PM   Result Value Ref Range    Hepatitis B Surface Antigen Negative Negative    Hepatitis B Cors Ab,IgM Negative Negative    Hepatitis A Virus Ab, IgM Negative Negative    Hepatitis C Antibody Negative Negative   RHEUMATOID ARTHRITIS FACTOR    Collection Time: 07/18/19 12:02 PM   Result Value Ref Range    Rheumatoid Factor -Neph- <10 0 - 14 IU/mL   T.PALLIDUM AB EIA    Collection Time: 07/18/19 12:02 PM   Result Value Ref Range    Syphilis, Treponemal Qual Non Reactive Non Reactive   VITAMIN B12    Collection Time: 07/18/19 12:02 PM   Result Value Ref Range    Vitamin B12 -True Cobalamin 710 211 - 911 pg/mL   HEMOGLOBIN A1C     Collection Time: 07/18/19 12:02 PM   Result Value Ref Range    Glycohemoglobin 5.8 (H) 0.0 - 5.6 %    Est Avg Glucose 120 mg/dL   CRP QUANTITIVE (NON-CARDIAC)    Collection Time: 07/18/19 12:02 PM   Result Value Ref Range    Stat C-Reactive Protein 0.41 0.00 - 0.75 mg/dL   FOLATE    Collection Time: 07/18/19 12:02 PM   Result Value Ref Range    Folate -Folic Acid 12.5 >4.0 ng/mL   VITAMIN B12    Collection Time: 07/18/19 12:02 PM   Result Value Ref Range    Vitamin B12 -True Cobalamin 687 211 - 911 pg/mL   Debbie Ink    Collection Time: 07/18/19  1:50 PM   Result Value Ref Range    Significant Indicator NEG     Source CSF     Site Tap     Debbie Ink No encapsulated yeast seen    CSF PROTEIN    Collection Time: 07/18/19  1:50 PM   Result Value Ref Range    Total Protein, CSF 31 15 - 45 mg/dL   CSF CELL COUNT    Collection Time: 07/18/19  1:50 PM   Result Value Ref Range    Number Of Tubes 4     Volume 16.0 mL    Color-Body Fluid Colorless     Character-Body Fluid Clear     Supernatant Appearance Colorless     Total RBC Count 6 cells/uL    Crenated RBC 0 %    Total WBC Count 3 0 - 10 cells/uL    Lymphs 95 %    Mononuclear Cells - CSF 5 %    CSF Tube Number 3    CSF GLUCOSE    Collection Time: 07/18/19  1:50 PM   Result Value Ref Range    Glucose CSF 56 40 - 80 mg/dL   GRAM STAIN    Collection Time: 07/18/19  1:50 PM   Result Value Ref Range    Significant Indicator .     Source CSF     Site Tap     Gram Stain Result No organisms seen.    CBC WITH DIFFERENTIAL    Collection Time: 07/19/19  4:19 AM   Result Value Ref Range    WBC 12.3 (H) 4.8 - 10.8 K/uL    RBC 4.82 4.20 - 5.40 M/uL    Hemoglobin 13.8 12.0 - 16.0 g/dL    Hematocrit 40.4 37.0 - 47.0 %    MCV 83.8 81.4 - 97.8 fL    MCH 28.6 27.0 - 33.0 pg    MCHC 34.2 33.6 - 35.0 g/dL    RDW 38.8 35.9 - 50.0 fL    Platelet Count 298 164 - 446 K/uL    MPV 10.7 9.0 - 12.9 fL    Neutrophils-Polys 63.90 44.00 - 72.00 %    Lymphocytes 27.40 22.00 - 41.00 %    Monocytes 6.40 0.00  - 13.40 %    Eosinophils 1.30 0.00 - 6.90 %    Basophils 0.50 0.00 - 1.80 %    Immature Granulocytes 0.50 0.00 - 0.90 %    Nucleated RBC 0.00 /100 WBC    Neutrophils (Absolute) 7.87 (H) 2.00 - 7.15 K/uL    Lymphs (Absolute) 3.38 1.00 - 4.80 K/uL    Monos (Absolute) 0.79 0.00 - 0.85 K/uL    Eos (Absolute) 0.16 0.00 - 0.51 K/uL    Baso (Absolute) 0.06 0.00 - 0.12 K/uL    Immature Granulocytes (abs) 0.06 0.00 - 0.11 K/uL    NRBC (Absolute) 0.00 K/uL   Comp Metabolic Panel    Collection Time: 07/19/19  4:19 AM   Result Value Ref Range    Sodium 137 135 - 145 mmol/L    Potassium 3.8 3.6 - 5.5 mmol/L    Chloride 102 96 - 112 mmol/L    Co2 26 20 - 33 mmol/L    Anion Gap 9.0 0.0 - 11.9    Glucose 103 (H) 65 - 99 mg/dL    Bun 10 8 - 22 mg/dL    Creatinine 0.77 0.50 - 1.40 mg/dL    Calcium 9.3 8.5 - 10.5 mg/dL    AST(SGOT) 54 (H) 12 - 45 U/L    ALT(SGPT) 95 (H) 2 - 50 U/L    Alkaline Phosphatase 68 30 - 99 U/L    Total Bilirubin 0.6 0.1 - 1.5 mg/dL    Albumin 3.9 3.2 - 4.9 g/dL    Total Protein 7.1 6.0 - 8.2 g/dL    Globulin 3.2 1.9 - 3.5 g/dL    A-G Ratio 1.2 g/dL   ESTIMATED GFR    Collection Time: 07/19/19  4:19 AM   Result Value Ref Range    GFR If African American >60 >60 mL/min/1.73 m 2    GFR If Non African American >60 >60 mL/min/1.73 m 2       MDM (Assessment and Plan):     Active Hospital Problems    Diagnosis   • Seizure (HCC) [R56.9]     Priority: High   • Leukocytosis [D72.829]     Priority: High   • Elevated LFTs [R94.5]     Priority: Medium   • Headache [R51]     Priority: Medium   • Pyuria [N39.0]     Priority: Medium   • Lactic acidosis [E87.2]     Priority: Medium   • Hypokalemia [E87.6]       Imaging/Procedures Review:      MRI Brain 7/17 : multifocal abnormal T2 hyperintensities in subcortical and periventricular while mater, no abnormal contrast enhancement, findings representing chronic demyelinating plaques which is new compared to MRI in 2008. Additionally, 6 mm parenchymal calcification in left  occipital lobe representing prior granulomatous infection.      EEG 7/17 : Intermittent left temporal slowing, rare left temporal sharps, findings suggesting increased seizure risk.     CT TAP 7/18 : Unremarkable, fatty infiltration of liver.    Assessment  This 24 year old healthy lady was admitted on 7/6/19 with headache and multiple seizure like episodes. LP at outside hospital was negative for acute meningitis. Work-up here showed hyperintense T2 signals not seen in 2008. EEG suggested increased risk of seizure activity. Repeat LP on 7/18 negative for meningitis, sent for autoimmune and paraneoplastic work-up. CT TAP negative for neoplasia. Started on keppra 500 mg BID and high-dose methylprednisolone for 3 days. Symptoms resolved.     # Seizure  # Headache  # Mild leucocytosis - ? Related to UTI / pyuria  # Pyuria - ? UTI, completed rocephin  # Transaminitis  # Lactic acidosis - resolved     Plan  # Continue keppra  # Complete IV methylpred 3 days  # Await autoimmune and paraneoplastic work-up on CSF, sent, pending  # Once she has completed her 3 days of IV solumedrol, she can be discharged  # Out-patient neurology appointment to follow-up on the results of autoimmune and paraneoplastic work-up      Thank you very much for allowing me to participate in the care of your patient.     Christi Rm M.D.    Reviewed items::  Labs, Medications and Radiology reports reviewed    Addendum   OK to discharge after 3 rd IV Solumedrol   Please give steroid taper.  Follow up in neurology clinic   Continue  mg bid   WE will sign off thank you for this consult.

## 2019-07-19 NOTE — DISCHARGE PLANNING
Anticipated Discharge Disposition: Home    Action: LSW met with pt, Rosanna bedside. LSW verified her address, phone# and EC. Pt does not have health ins and no PCP. Pt has not questions at this time. Pt does not have a qualifying event to sign up for insurance and her income is too eunice to qualify for [place] Medicaid.     Barriers to Discharge: Medical Clearance    Plan: Pt to dc home.    Care Transition Team Assessment    Information Source  Orientation : Oriented x 4  Information Given By: Patient    Readmission Evaluation  Is this a readmission?: No    Elopement Risk  Legal Hold: No  Ambulatory or Self Mobile in Wheelchair: Yes  Disoriented: No  Psychiatric Symptoms: None  History of Wandering: No  Elopement this Admit: No  Vocalizing Wanting to Leave: No  Displays Behaviors, Body Language Wanting to Leave: No-Not at Risk for Elopement  Elopement Risk: Not at Risk for Elopement    Interdisciplinary Discharge Planning  Does Admitting Nurse Feel This Could be a Complex Discharge?: No  Primary Care Physician: None  Lives with - Patient's Self Care Capacity: Parents, Child Less than 18 Years of Age  Patient or legal guardian wants to designate a caregiver (see row info): No  Support Systems: Children, Parent  Housing / Facility: [address]  Do You Take your Prescribed Medications Regularly: No  Able to Return to Previous ADL's: Yes  Mobility Issues: No  Prior Services: None  Patient Expects to be Discharged to:: Home  Assistance Needed: No  Durable Medical Equipment: Not Applicable    Discharge Preparedness  What is your plan after discharge?: Home with help  What are your discharge supports?: Child, Parent  Prior Functional Level: Ambulatory, Drives Self, Independent with Activities of Daily Living, Independent with Medication Management    Functional Assesment  Prior Functional Level: Ambulatory, Drives Self, Independent with Activities of Daily Living, Independent with Medication Management    Vision / Hearing  Impairment  Right Eye Vision: Wears Glasses  Left Eye Vision: Wears Glasses    Domestic Abuse  Have you ever been the victim of abuse or violence?: No  Physical Abuse or Sexual Abuse: No  Verbal Abuse or Emotional Abuse: No    Psychological Assessment  History of Substance Abuse: None  History of Psychiatric Problems: No    Discharge Risks or Barriers  Discharge risks or barriers?: No PCP, Uninsured / underinsured    Anticipated Discharge Information  Anticipated discharge disposition: Home  Discharge Address: [address] [place] [address] [address], [facility]  Discharge Contact Phone Number: [phone]

## 2019-07-19 NOTE — PROGRESS NOTES
Hospital Medicine Daily Progress Note    Date of Service  7/19/2019    Chief Complaint  24 y.o. female admitted 7/16/2019 with Seizure    Hospital Course  Admitted with Seizure, presented initially as Headache.    Interval Problem Update  Seizure - MRI showed there are multifocal abnormal T2 hyperintensities in the subcortical and periventricular white matter. There is no abnormal contrast enhancement. This is new since the previous MRI dated 2/8/2008 These findings likely represents chronic demyelinating plaques. Discussed case with Neurology  Headache - resolved  Lactic acidosis - trended down    Lengthy discussion with pt and family, updates given, plan of care discussed.    Consultants/Specialty  Neurology    Code Status  Full    Disposition  TBD    Review of Systems  Review of Systems   Constitutional: Negative for chills, fever and malaise/fatigue.   HENT: Negative for hearing loss and sore throat.    Eyes: Negative for blurred vision.   Respiratory: Negative for cough, shortness of breath and wheezing.    Cardiovascular: Negative for chest pain, palpitations and leg swelling.   Gastrointestinal: Negative for abdominal pain, diarrhea, heartburn, nausea and vomiting.   Genitourinary: Negative for dysuria, flank pain and hematuria.   Musculoskeletal: Negative for back pain and neck pain.   Skin: Negative for rash.   Neurological: Negative for dizziness, sensory change, speech change, focal weakness, seizures, weakness and headaches.   Psychiatric/Behavioral: The patient is not nervous/anxious.         Physical Exam  Temp:  [36.6 °C (97.8 °F)-37.3 °C (99.2 °F)] 37.3 °C (99.2 °F)  Pulse:  [74-90] 90  Resp:  [16-17] 16  BP: (112-141)/(73-87) 117/82  SpO2:  [93 %-100 %] 100 %    Physical Exam   Constitutional: She is oriented to person, place, and time. She appears well-developed and well-nourished.   HENT:   Head: Normocephalic and atraumatic.   Eyes: Pupils are equal, round, and reactive to light. Conjunctivae are  normal.   Neck: No tracheal deviation present. No thyromegaly present.   Cardiovascular: Normal rate and regular rhythm.    Pulmonary/Chest: Effort normal and breath sounds normal.   Abdominal: Soft. Bowel sounds are normal. She exhibits no distension. There is no tenderness.   Musculoskeletal: She exhibits no edema.   Lymphadenopathy:     She has no cervical adenopathy.   Neurological: She is alert and oriented to person, place, and time. No cranial nerve deficit.   MMT 5/5   Skin: Skin is warm and dry.   Nursing note and vitals reviewed.      Fluids    Intake/Output Summary (Last 24 hours) at 07/19/19 1411  Last data filed at 07/19/19 0800   Gross per 24 hour   Intake              360 ml   Output                0 ml   Net              360 ml       Laboratory  Recent Labs      07/16/19   1507  07/17/19   0621  07/19/19   0419   WBC  16.0*  9.7  12.3*   RBC  4.55  4.19*  4.82   HEMOGLOBIN  12.8  11.8*  13.8   HEMATOCRIT  38.1  36.0*  40.4   MCV  83.7  85.9  83.8   MCH  28.1  28.2  28.6   MCHC  33.6  32.8*  34.2   RDW  39.5  41.0  38.8   PLATELETCT  290  256  298   MPV  10.7  11.0  10.7     Recent Labs      07/16/19   1507  07/17/19   0621  07/19/19   0419   SODIUM  136  139  137   POTASSIUM  3.9  3.6  3.8   CHLORIDE  108  108  102   CO2  18*  22  26   GLUCOSE  110*  90  103*   BUN  8  6*  10   CREATININE  0.59  0.63  0.77   CALCIUM  8.0*  8.4*  9.3                   Imaging  CT-CHEST,ABDOMEN,PELVIS WITH   Final Result      1.  There is no evidence of pneumonia.   2.  There is no acute inflammatory process within the abdomen or pelvis.   3.  There is no evidence of mass or adenopathy in the chest, abdomen, or pelvis.   4.  There is diffuse hepatic fatty infiltration.      MR-BRAIN-WITH & W/O   Final Result      1.  There are multifocal abnormal T2 hyperintensities in the subcortical and periventricular white matter. There is no abnormal contrast enhancement. This is new since the previous MRI dated 2/8/2008 These  findings likely represents chronic demyelinating    plaques.   2.  Stable an approximately 6 mm sized parenchymal calcification in the left occipital lobe likely representing chronic sequelae previous granulomatous infection. This is stable since the previous MRI dated 2/8/2008.      OUTSIDE IMAGES-DX CHEST   Final Result      OUTSIDE IMAGES-CT HEAD   Final Result           Assessment/Plan  * Seizure (HCC)- (present on admission)   Assessment & Plan    Keppra  Follow LP and lab results     Abnormal MRI of head- (present on admission)   Assessment & Plan    There are multifocal abnormal T2 hyperintensities in the subcortical and periventricular white matter. There is no abnormal contrast enhancement. This is new since the previous MRI dated 2/8/2008 These findings likely represents chronic demyelinating plaques.  IV Solumedrol 1 g daily x 3 days     Leukocytosis- (present on admission)   Assessment & Plan    Follow cbc       Elevated LFTs- (present on admission)   Assessment & Plan    Follow cmp     Lactic acidosis- (present on admission)   Assessment & Plan    resolved       Pyuria- (present on admission)   Assessment & Plan    Finished course of Rocephin       Headache- (present on admission)   Assessment & Plan    resolved     Hypokalemia- (present on admission)   Assessment & Plan    Follow cmp            VTE prophylaxis: SCD

## 2019-07-19 NOTE — PROGRESS NOTES
Assumed pt care at 1900. Bedside report received from JOSE G Mata. Pt resting comfortably in bed, family present at bedside. Pt A&Ox4, PERRL, MITCHELL, and follows commands. No c/o pain, SOB, N/V/D. All questions and concerns of the pt and family addressed at this time. Pt on RA, tele monitor in place. Bed locked and in lowest position. Bed alarm on. Call light within reach. Hourly rounding in place. No needs at this time.

## 2019-07-19 NOTE — ASSESSMENT & PLAN NOTE
There are multifocal abnormal T2 hyperintensities in the subcortical and periventricular white matter. There is no abnormal contrast enhancement. This is new since the previous MRI dated 2/8/2008 These findings likely represents chronic demyelinating plaques.  IV Solumedrol 1 g daily x 3 days

## 2019-07-19 NOTE — CARE PLAN
Problem: Communication  Goal: The ability to communicate needs accurately and effectively will improve  Outcome: PROGRESSING AS EXPECTED  Pt A&Ox4, calls appropriately using call light and is able to make needs known to staff.     Problem: Safety  Goal: Will remain free from injury  Outcome: PROGRESSING AS EXPECTED  Seizure and universal fall precautions in place. Bed locked and in lowest position. Bed alarm on. Call light within reach.

## 2019-07-19 NOTE — PROGRESS NOTES
Monitor summary: SR-ST , CA .18, QRS .08, QT .36, with a rare PAC and a HR as high as 140 per strip from monitor room.

## 2019-07-19 NOTE — PROGRESS NOTES
Patient awake, alert, and an active participant in plan of care.  She expressed satisfaction and agreement with stated daily goals, denies any needs at this time.  Family is at bedside, call light in reach.  HR 72 and regular per cardiac monitor.

## 2019-07-20 LAB
ALBUMIN SERPL-MCNC: 4.3 G/DL (ref 3.75–5.01)
ALPHA1 GLOB SERPL ELPH-MCNC: 0.31 G/DL (ref 0.19–0.46)
ALPHA2 GLOB SERPL ELPH-MCNC: 0.87 G/DL (ref 0.48–1.05)
ANCA IGG TITR SER IF: NORMAL {TITER}
B-GLOBULIN SERPL ELPH-MCNC: 0.76 G/DL (ref 0.48–1.1)
C GATTII+NEOFOR DNA CSF QL NAA+NON-PROBE: NOT DETECTED
CARDIOLIPIN IGA SER IA-ACNC: 2 APL (ref 0–11)
CARDIOLIPIN IGG SER IA-ACNC: 6 GPL (ref 0–14)
CARDIOLIPIN IGM SER IA-ACNC: 9 MPL (ref 0–12)
CENTROMERE IGG TITR SER IF: 4 AU/ML (ref 0–40)
CMV DNA CSF QL NAA+NON-PROBE: NOT DETECTED
E COLI K1 DNA CSF QL NAA+NON-PROBE: NOT DETECTED
EER PROT ELECT SER Q1092: NORMAL
ENA JO1 AB TITR SER: 2 AU/ML (ref 0–40)
ENA SCL70 IGG SER QL: 9 AU/ML (ref 0–40)
ENA SM IGG SER-ACNC: 4 AU/ML (ref 0–40)
ENA SS-B IGG SER IA-ACNC: 2 AU/ML (ref 0–40)
EV RNA CSF QL NAA+NON-PROBE: NOT DETECTED
GAMMA GLOB SERPL ELPH-MCNC: 1.46 G/DL (ref 0.62–1.51)
GP B STREP DNA CSF QL NAA+NON-PROBE: NOT DETECTED
HAEM INFLU DNA CSF QL NAA+NON-PROBE: NOT DETECTED
HHV6 DNA CSF QL NAA+NON-PROBE: NOT DETECTED
HSV1 DNA CSF QL NAA+NON-PROBE: NOT DETECTED
HSV2 DNA CSF QL NAA+NON-PROBE: NOT DETECTED
INTERPRETATION SERPL IFE-IMP: NORMAL
L MONOCYTOG DNA CSF QL NAA+NON-PROBE: NOT DETECTED
N MEN DNA CSF QL NAA+NON-PROBE: NOT DETECTED
NUCLEAR IGG SER QL IA: NORMAL
PARECHOVIRUS A RNA CSF QL NAA+NON-PROBE: NOT DETECTED
PROT SERPL-MCNC: 7.7 G/DL (ref 6–8.3)
RIBOSOMAL P AB SER-ACNC: 5 AU/ML (ref 0–40)
S PNEUM DNA CSF QL NAA+NON-PROBE: NOT DETECTED
SSA52 R0ENA AB IGG Q0420: 5 AU/ML (ref 0–40)
SSA60 R0ENA AB IGG Q0419: 5 AU/ML (ref 0–40)
U1 SNRNP IGG SER QL: 5 AU/ML (ref 0–40)
VZV DNA CSF QL NAA+NON-PROBE: NOT DETECTED

## 2019-07-20 PROCEDURE — 700102 HCHG RX REV CODE 250 W/ 637 OVERRIDE(OP): Performed by: INTERNAL MEDICINE

## 2019-07-20 PROCEDURE — 770020 HCHG ROOM/CARE - TELE (206)

## 2019-07-20 PROCEDURE — 700111 HCHG RX REV CODE 636 W/ 250 OVERRIDE (IP): Performed by: FAMILY MEDICINE

## 2019-07-20 PROCEDURE — 99232 SBSQ HOSP IP/OBS MODERATE 35: CPT | Performed by: HOSPITALIST

## 2019-07-20 PROCEDURE — 700105 HCHG RX REV CODE 258: Performed by: FAMILY MEDICINE

## 2019-07-20 PROCEDURE — A9270 NON-COVERED ITEM OR SERVICE: HCPCS | Performed by: INTERNAL MEDICINE

## 2019-07-20 RX ADMIN — SODIUM CHLORIDE 1000 MG: 9 INJECTION, SOLUTION INTRAVENOUS at 04:21

## 2019-07-20 RX ADMIN — LEVETIRACETAM 500 MG: 500 TABLET ORAL at 17:51

## 2019-07-20 RX ADMIN — LEVETIRACETAM 500 MG: 500 TABLET ORAL at 04:20

## 2019-07-20 ASSESSMENT — ENCOUNTER SYMPTOMS
NERVOUS/ANXIOUS: 0
SORE THROAT: 0
ABDOMINAL PAIN: 0
NAUSEA: 0
WEAKNESS: 0
NECK PAIN: 0
SENSORY CHANGE: 0
VOMITING: 0
BLURRED VISION: 0
HEARTBURN: 0
SEIZURES: 0
FLANK PAIN: 0
FOCAL WEAKNESS: 0
DIARRHEA: 0
CHILLS: 0
COUGH: 0
WHEEZING: 0
FEVER: 0
SPEECH CHANGE: 0
DIZZINESS: 0
SHORTNESS OF BREATH: 0
BACK PAIN: 0
HEADACHES: 0
PALPITATIONS: 0

## 2019-07-20 NOTE — PROGRESS NOTES
Patient sleeping during bedside report, safety measures in place, call light in reach, mother at bedside.

## 2019-07-20 NOTE — CARE PLAN
Problem: Communication  Goal: The ability to communicate needs accurately and effectively will improve  Outcome: PROGRESSING AS EXPECTED  Pt A&Ox4, calls appropriately using call light and is able to make needs known to staff.     Problem: Safety  Goal: Will remain free from injury  Outcome: PROGRESSING AS EXPECTED  Seizure and universal fall precautions in place. Bed locked and in lowest position. Seizure precaution rationale explained to patient and family, verbalized understanding.

## 2019-07-20 NOTE — PROGRESS NOTES
Hospital Medicine Daily Progress Note    Date of Service  7/20/2019    Chief Complaint  24 y.o. female admitted 7/16/2019 with Seizure    Hospital Course  Patient is a 24 year old with history of seizures and extensive medical history who was transferred from [place] with headaches and seizures.    Interval Problem Update  She has had no further seizures  No further headache  She was seen by neurology and is on day 2/3 of IV steroids    Consultants/Specialty  Neurology    Code Status  Full    Disposition  TBD    Review of Systems  Review of Systems   Constitutional: Negative for chills, fever and malaise/fatigue.   HENT: Negative for hearing loss and sore throat.    Eyes: Negative for blurred vision.   Respiratory: Negative for cough, shortness of breath and wheezing.    Cardiovascular: Negative for chest pain, palpitations and leg swelling.   Gastrointestinal: Negative for abdominal pain, diarrhea, heartburn, nausea and vomiting.   Genitourinary: Negative for dysuria, flank pain and hematuria.   Musculoskeletal: Negative for back pain and neck pain.   Skin: Negative for rash.   Neurological: Negative for dizziness, sensory change, speech change, focal weakness, seizures, weakness and headaches.   Psychiatric/Behavioral: The patient is not nervous/anxious.         Physical Exam  Temp:  [36.1 °C (97 °F)-37.2 °C (98.9 °F)] 37.1 °C (98.8 °F)  Pulse:  [] 88  Resp:  [16-20] 16  BP: (115-134)/(72-86) 134/86  SpO2:  [92 %-100 %] 93 %    Physical Exam   Constitutional: She is oriented to person, place, and time. She appears well-developed and well-nourished.   HENT:   Head: Normocephalic and atraumatic.   Eyes: Pupils are equal, round, and reactive to light. Conjunctivae are normal.   Neck: No tracheal deviation present. No thyromegaly present.   Cardiovascular: Normal rate and regular rhythm.    Pulmonary/Chest: Effort normal and breath sounds normal.   Abdominal: Soft. Bowel sounds are normal. She exhibits no  distension. There is no tenderness.   Musculoskeletal: She exhibits no edema.   Lymphadenopathy:     She has no cervical adenopathy.   Neurological: She is alert and oriented to person, place, and time. No cranial nerve deficit.   MMT 5/5   Skin: Skin is warm and dry.   Nursing note and vitals reviewed.      Fluids    Intake/Output Summary (Last 24 hours) at 07/20/19 1029  Last data filed at 07/20/19 0000   Gross per 24 hour   Intake              240 ml   Output                0 ml   Net              240 ml       Laboratory  Recent Labs      07/19/19   0419   WBC  12.3*   RBC  4.82   HEMOGLOBIN  13.8   HEMATOCRIT  40.4   MCV  83.8   MCH  28.6   MCHC  34.2   RDW  38.8   PLATELETCT  298   MPV  10.7     Recent Labs      07/19/19   0419   SODIUM  137   POTASSIUM  3.8   CHLORIDE  102   CO2  26   GLUCOSE  103*   BUN  10   CREATININE  0.77   CALCIUM  9.3                   Imaging  CT-CHEST,ABDOMEN,PELVIS WITH   Final Result      1.  There is no evidence of pneumonia.   2.  There is no acute inflammatory process within the abdomen or pelvis.   3.  There is no evidence of mass or adenopathy in the chest, abdomen, or pelvis.   4.  There is diffuse hepatic fatty infiltration.      MR-BRAIN-WITH & W/O   Final Result      1.  There are multifocal abnormal T2 hyperintensities in the subcortical and periventricular white matter. There is no abnormal contrast enhancement. This is new since the previous MRI dated 2/8/2008 These findings likely represents chronic demyelinating    plaques.   2.  Stable an approximately 6 mm sized parenchymal calcification in the left occipital lobe likely representing chronic sequelae previous granulomatous infection. This is stable since the previous MRI dated 2/8/2008.      OUTSIDE IMAGES-DX CHEST   Final Result      OUTSIDE IMAGES-CT HEAD   Final Result           Assessment/Plan  * Seizure (HCC)- (present on admission)   Assessment & Plan    Continue Keppra  LP and lab results pending     Abnormal  MRI of head- (present on admission)   Assessment & Plan    There are multifocal abnormal T2 hyperintensities in the subcortical and periventricular white matter. There is no abnormal contrast enhancement. This is new since the previous MRI dated 2/8/2008 These findings likely represents chronic demyelinating plaques.  IV Solumedrol 1 g daily x 3 days     Leukocytosis- (present on admission)   Assessment & Plan    Minimal         Elevated LFTs- (present on admission)   Assessment & Plan    Will repeat     Lactic acidosis- (present on admission)   Assessment & Plan    resolved       Pyuria- (present on admission)   Assessment & Plan    Finished course of Rocephin       Headache- (present on admission)   Assessment & Plan    resolved     Hypokalemia- (present on admission)   Assessment & Plan    resolved            VTE prophylaxis: SCD

## 2019-07-21 ENCOUNTER — PATIENT OUTREACH (OUTPATIENT)
Dept: HEALTH INFORMATION MANAGEMENT | Facility: OTHER | Age: 24
End: 2019-07-21

## 2019-07-21 VITALS
HEART RATE: 50 BPM | DIASTOLIC BLOOD PRESSURE: 69 MMHG | RESPIRATION RATE: 16 BRPM | BODY MASS INDEX: 34.12 KG/M2 | WEIGHT: 212.3 LBS | TEMPERATURE: 98.4 F | OXYGEN SATURATION: 96 % | HEIGHT: 66 IN | SYSTOLIC BLOOD PRESSURE: 133 MMHG

## 2019-07-21 PROBLEM — E87.20 LACTIC ACIDOSIS: Status: RESOLVED | Noted: 2019-07-16 | Resolved: 2019-07-21

## 2019-07-21 PROBLEM — R51.9 HEADACHE: Status: RESOLVED | Noted: 2019-07-16 | Resolved: 2019-07-21

## 2019-07-21 PROBLEM — R56.9 SEIZURE (HCC): Status: RESOLVED | Noted: 2019-07-16 | Resolved: 2019-07-21

## 2019-07-21 PROBLEM — R82.81 PYURIA: Status: RESOLVED | Noted: 2019-07-16 | Resolved: 2019-07-21

## 2019-07-21 LAB
ALB CSF/SERPL: 3.4 RATIO (ref 0–9)
ALBUMIN CSF-MCNC: 12 MG/DL (ref 0–35)
ALBUMIN SERPL BCP-MCNC: 3.9 G/DL (ref 3.2–4.9)
ALBUMIN SERPL-MCNC: 3560 MG/DL (ref 3500–5200)
ALBUMIN/GLOB SERPL: 1.2 G/DL
ALP SERPL-CCNC: 66 U/L (ref 30–99)
ALT SERPL-CCNC: 79 U/L (ref 2–50)
ANION GAP SERPL CALC-SCNC: 7 MMOL/L (ref 0–11.9)
AST SERPL-CCNC: 28 U/L (ref 12–45)
BACTERIA BLD CULT: NORMAL
BACTERIA BLD CULT: NORMAL
BACTERIA CSF CULT: NORMAL
BILIRUB SERPL-MCNC: 0.3 MG/DL (ref 0.1–1.5)
BUN SERPL-MCNC: 19 MG/DL (ref 8–22)
CALCIUM SERPL-MCNC: 9.5 MG/DL (ref 8.5–10.5)
CHLORIDE SERPL-SCNC: 106 MMOL/L (ref 96–112)
CO2 SERPL-SCNC: 25 MMOL/L (ref 20–33)
CREAT SERPL-MCNC: 0.64 MG/DL (ref 0.5–1.4)
GLOBULIN SER CALC-MCNC: 3.3 G/DL (ref 1.9–3.5)
GLUCOSE SERPL-MCNC: 136 MG/DL (ref 65–99)
GRAM STN SPEC: NORMAL
IGG CSF-MCNC: 5 MG/DL (ref 0–6)
IGG SERPL-MCNC: 1340 MG/DL (ref 768–1632)
IGG SYNTH RATE SER+CSF CALC-MRATE: 9.8 MG/D
IGG/ALB CLEAR SER+CSF-RTO: 1.11 RATIO (ref 0.28–0.66)
IGG/ALB CSF: 0.42 RATIO (ref 0.09–0.25)
MBP CSF-MCNC: 2.15 NG/ML (ref 0–5.5)
MYELOPEROXIDASE AB SER-ACNC: 0 AU/ML (ref 0–19)
OLIGOCLONAL BANDS CSF ELPH-IMP: ABNORMAL
OLIGOCLONAL BANDS CSF ELPH-IMP: POSITIVE
OLIGOCLONAL BANDS CSF IEF: 12 BANDS (ref 0–1)
POTASSIUM SERPL-SCNC: 4.4 MMOL/L (ref 3.6–5.5)
PROT SERPL-MCNC: 7.2 G/DL (ref 6–8.2)
PROTEINASE3 AB SER-ACNC: 2 AU/ML (ref 0–19)
SIGNIFICANT IND 70042: NORMAL
SITE SITE: NORMAL
SODIUM SERPL-SCNC: 138 MMOL/L (ref 135–145)
SOURCE SOURCE: NORMAL
VDRL CSF QL: NON REACTIVE

## 2019-07-21 PROCEDURE — A9270 NON-COVERED ITEM OR SERVICE: HCPCS | Performed by: INTERNAL MEDICINE

## 2019-07-21 PROCEDURE — 36415 COLL VENOUS BLD VENIPUNCTURE: CPT

## 2019-07-21 PROCEDURE — 700105 HCHG RX REV CODE 258: Performed by: FAMILY MEDICINE

## 2019-07-21 PROCEDURE — 99239 HOSP IP/OBS DSCHRG MGMT >30: CPT | Performed by: HOSPITALIST

## 2019-07-21 PROCEDURE — 700111 HCHG RX REV CODE 636 W/ 250 OVERRIDE (IP): Performed by: FAMILY MEDICINE

## 2019-07-21 PROCEDURE — 80053 COMPREHEN METABOLIC PANEL: CPT

## 2019-07-21 PROCEDURE — 700102 HCHG RX REV CODE 250 W/ 637 OVERRIDE(OP): Performed by: INTERNAL MEDICINE

## 2019-07-21 RX ORDER — PREDNISONE 10 MG/1
10 TABLET ORAL DAILY
Qty: 12 TAB | Refills: 0 | Status: SHIPPED
Start: 2019-07-21 | End: 2020-02-10

## 2019-07-21 RX ORDER — LEVETIRACETAM 500 MG/1
500 TABLET ORAL 2 TIMES DAILY
Qty: 60 TAB | Refills: 1 | Status: SHIPPED | OUTPATIENT
Start: 2019-07-21 | End: 2019-08-14

## 2019-07-21 RX ADMIN — SODIUM CHLORIDE 1000 MG: 9 INJECTION, SOLUTION INTRAVENOUS at 06:03

## 2019-07-21 RX ADMIN — LEVETIRACETAM 500 MG: 500 TABLET ORAL at 06:03

## 2019-07-21 NOTE — DISCHARGE SUMMARY
"Discharge Summary    CHIEF COMPLAINT ON ADMISSION  Chief Complaint   Patient presents with   • Seizure     Patient witnessed to have seizure at home today.  Taken to [place] where patient has additional seizure.   Hx of seizures \"years ago\".  Transferred for Neuro consult.           Reason for Admission  Transfer     Admission Date  7/16/2019    CODE STATUS  Full Code    HPI & HOSPITAL COURSE  This is a 24 y.o. female who presented to [facility] on 7/16/19 with a severe headache and had a tonic- clonic seizure in the ER.  She underwent a lumbar puncture and was transferred here for neurology consultation.  She was started on keppra and when MRI was suggestive of demylinating plaques she was started on IV steroids.  Patient is doing well, her headache has resolved and she is at her baseline. She has had no further seizure activity.  She also had a mild elevation in lft's which is now resolving. A viral hepatitis panel was negative.  She agrees to have this followed as an outpatient.  She also agrees not to drive until cleared by neurology as an outpatient and neurology will follow up with additional work up and review of pending autoimmune serologies. She agrees to return to the er if needed.       Therefore, she is discharged in fair and stable condition to home with close outpatient follow-up.    The patient met 2-midnight criteria for an inpatient stay at the time of discharge.    Discharge Date  7/21/19    FOLLOW UP ITEMS POST DISCHARGE  Neurology  PCP    DISCHARGE DIAGNOSES  Principal Problem (Resolved):    Seizure (HCC) POA: Yes  Active Problems:    Leukocytosis POA: Yes    Abnormal MRI of head POA: Yes    Elevated LFTs POA: Yes    Hypokalemia POA: Yes  Resolved Problems:    Headache POA: Yes    Pyuria POA: Yes    Lactic acidosis POA: Yes      FOLLOW UP  No future appointments.  Elliot Whelan M.D.  [address] [place] [id number] [phone]    In 2 weeks      [facility]  [address] [place] [id number] [phone]    Please call [facility] to schedule an appointment with a primary care provider at a discounted rate.Thank you      MEDICATIONS ON DISCHARGE     Medication List      START taking these medications      Instructions   levETIRAcetam 500 MG Tabs  Commonly known as:  KEPPRA   Take 1 Tab by mouth 2 Times a Day.  Dose:  500 mg     predniSONE 10 MG Tabs  Commonly known as:  DELTASONE   Take 1 Tab by mouth every day. 10 mg po bid x 3 days, then 10 mg po daily x 3 days, then 5 mg po daily x 3 days then stop  Dose:  10 mg        CONTINUE taking these medications      Instructions   asa/apap/caffeine 250-250-65 MG Tabs  Commonly known as:  EXCEDRIN   Take 1 Tab by mouth every 6 hours as needed for Headache.  Dose:  1 Tab            Allergies  Allergies   Allergen Reactions   • Nkda [No Known Drug Allergy]        DIET  Orders Placed This Encounter   Procedures   • Diet Order Regular     Standing Status:   Standing     Number of Occurrences:   1     Order Specific Question:   Diet:     Answer:   Regular [1]   • Discontinue Diet Tray     Standing Status:   Standing     Number of Occurrences:   1       ACTIVITY  As tolerated. No driving until cleared by neuro as an outpatient      CONSULTATIONS  Neurology    PROCEDURES  CT-CHEST,ABDOMEN,PELVIS WITH   Final Result      1.  There is no evidence of pneumonia.   2.  There is no acute inflammatory process within the abdomen or pelvis.   3.  There is no evidence of mass or adenopathy in the chest, abdomen, or pelvis.   4.  There is diffuse hepatic fatty infiltration.      MR-BRAIN-WITH & W/O   Final Result      1.  There are multifocal abnormal T2 hyperintensities in the subcortical and periventricular white matter. There is no abnormal contrast enhancement. This is new since the previous MRI dated 2/8/2008 These findings likely represents chronic demyelinating    plaques.   2.  Stable an  approximately 6 mm sized parenchymal calcification in the left occipital lobe likely representing chronic sequelae previous granulomatous infection. This is stable since the previous MRI dated 2/8/2008.      OUTSIDE IMAGES-DX CHEST   Final Result      OUTSIDE IMAGES-CT HEAD   Final Result            LABORATORY  Lab Results   Component Value Date    SODIUM 138 07/21/2019    POTASSIUM 4.4 07/21/2019    CHLORIDE 106 07/21/2019    CO2 25 07/21/2019    GLUCOSE 136 (H) 07/21/2019    BUN 19 07/21/2019    CREATININE 0.64 07/21/2019        Lab Results   Component Value Date    WBC 12.3 (H) 07/19/2019    HEMOGLOBIN 13.8 07/19/2019    HEMATOCRIT 40.4 07/19/2019    PLATELETCT 298 07/19/2019        Total time of the discharge process exceeds 45 minutes.

## 2019-07-21 NOTE — DISCHARGE INSTRUCTIONS
Discharge Instructions    Discharged to home by car with relative. Discharged via wheelchair, hospital escort: Yes.  Special equipment needed: Not Applicable    Be sure to schedule a follow-up appointment with your primary care doctor or any specialists as instructed.     Discharge Plan:   Pneumococcal Vaccine Administered/Refused: Not given - Patient refused pneumococcal vaccine  Influenza Vaccine Indication: Patient Refuses    I understand that a diet low in cholesterol, fat, and sodium is recommended for good health. Unless I have been given specific instructions below for another diet, I accept this instruction as my diet prescription.   Other diet: regular      Special Instructions: None    · Is patient discharged on Warfarin / Coumadin?   No     Depression / Suicide Risk    As you are discharged from this [facility] facility, it is important to learn how to keep safe from harming yourself.    Recognize the warning signs:  · Abrupt changes in personality, positive or negative- including increase in energy   · Giving away possessions  · Change in eating patterns- significant weight changes-  positive or negative  · Change in sleeping patterns- unable to sleep or sleeping all the time   · Unwillingness or inability to communicate  · Depression  · Unusual sadness, discouragement and loneliness  · Talk of wanting to die  · Neglect of personal appearance   · Rebelliousness- reckless behavior  · Withdrawal from people/activities they love  · Confusion- inability to concentrate     If you or a loved one observes any of these behaviors or has concerns about self-harm, here's what you can do:  · Talk about it- your feelings and reasons for harming yourself  · Remove any means that you might use to hurt yourself (examples: pills, rope, extension cords, firearm)  · Get professional help from the community (Mental Health, Substance Abuse, psychological counseling)  · Do not be alone:Call your Safe Contact- someone whom  you trust who will be there for you.  · Call your local CRISIS HOTLINE 906-0256 or 365-774-6179  · Call your local Children's Mobile Crisis Response Team [place] (418) 695-6782 or www.[employer]antra  · Call the toll free National Suicide Prevention Hotlines   · National Suicide Prevention Lifeline 767-524-GDYP (6036)  · National Life Metrics Line Network 800-SUICIDE (304-8599)

## 2019-07-21 NOTE — CARE PLAN
Problem: Safety  Goal: Will remain free from falls  Outcome: PROGRESSING AS EXPECTED  Call light within reach- pt calls appropriately. Bed locked and in lowest position. Non slip socks on. Rounding maintained.    Problem: Bowel/Gastric:  Goal: Normal bowel function is maintained or improved  Outcome: PROGRESSING AS EXPECTED   7/19

## 2019-07-22 LAB — VIT B1 BLD-MCNC: 146 NMOL/L (ref 70–180)

## 2019-07-23 LAB
WNV IGG CSF IA-ACNC: 0.14 IV
WNV IGM CSF IA-ACNC: 0.01 IV

## 2019-07-24 ENCOUNTER — TELEPHONE (OUTPATIENT)
Dept: NEUROLOGY | Facility: MEDICAL CENTER | Age: 24
End: 2019-07-24

## 2019-07-24 NOTE — TELEPHONE ENCOUNTER
Asia Paz M.D.  Raina Melchor, Med Ass't             Spoke with patient   I saw her as inpatient   She should come and see me within 4 weeks     Spoke to patient scheduled for Aug 14 @ 1:00.

## 2019-07-26 LAB — VIT B6 SERPL-MCNC: 46.9 NMOL/L (ref 20–125)

## 2019-07-31 LAB — UNCODED TEST RESULT 95040: NORMAL

## 2019-08-10 LAB — TEST NAME 95000: NORMAL

## 2019-08-14 ENCOUNTER — OFFICE VISIT (OUTPATIENT)
Dept: NEUROLOGY | Facility: MEDICAL CENTER | Age: 24
End: 2019-08-14

## 2019-08-14 VITALS
HEIGHT: 62 IN | OXYGEN SATURATION: 96 % | SYSTOLIC BLOOD PRESSURE: 130 MMHG | DIASTOLIC BLOOD PRESSURE: 66 MMHG | RESPIRATION RATE: 16 BRPM | BODY MASS INDEX: 37.65 KG/M2 | HEART RATE: 106 BPM | TEMPERATURE: 97.8 F | WEIGHT: 204.6 LBS

## 2019-08-14 DIAGNOSIS — G35 MULTIPLE SCLEROSIS (HCC): ICD-10-CM

## 2019-08-14 LAB
FUNGUS SPEC CULT: NORMAL
SIGNIFICANT IND 70042: NORMAL
SITE SITE: NORMAL
SOURCE SOURCE: NORMAL

## 2019-08-14 PROCEDURE — 99215 OFFICE O/P EST HI 40 MIN: CPT

## 2019-08-14 RX ORDER — LEVETIRACETAM 500 MG/1
500 TABLET ORAL 2 TIMES DAILY
Qty: 90 TAB | Refills: 3 | Status: SHIPPED
Start: 2019-08-14 | End: 2020-02-10

## 2019-08-14 ASSESSMENT — PATIENT HEALTH QUESTIONNAIRE - PHQ9: CLINICAL INTERPRETATION OF PHQ2 SCORE: 0

## 2019-08-16 ASSESSMENT — ENCOUNTER SYMPTOMS: HEADACHES: 1

## 2019-08-16 NOTE — PROGRESS NOTES
"Patient presents for a follow up after recent discharge from the hospital during which she was seen by me.    CC : \" I am doing great\"    HPI  25 yo very pleasant female who I had recently seen and taken care of at [facility] while inpatient presents for a follow up appointment. She is doing very well and denies any further headaches or seizures.  Her LP results had resulted and she is here to discuss diagnosis and plan.    Please see below my original consult from   25 yo woman with acute onset left sided throbbing headache associated with sensitivity to light and sound which started last week on a Tuesday ( 1 week ago). She had daily headaches which were preventing her from sleeping and waking her up from sleep. She used to have similar headaches when she was younger however never multiple episodes all day every day. She was taking OTC acetaminophen and paracetamol to relieve 10/10 pain however this was very brief and pain would come back. She reports no vision loss, no blurry vision or diplopia, no fevers, night sweats and no weight loss or sick contacts.  Family reports no odd behavior, no hallucinations or delusions.  She suddenly developed GTC x 2 yesterday during the day and she had additional 2 or 3 episodes today one while in the outside hospital where she received Ativan and a load of LEV. GTC seizures were full body convulsions with lateral tongue bite and postictal confusion lasting 15-20 minutes.  She had LP at [facility] and was immmediately transferred here because of ongoing seizure activity.  Family and patient tell me that she has exact similar episode with one seizure in 2008 before which she also had headache.  She is currently fatigued and slightly postictal however back to baseline and answering questions and following all commands.  She had no seizures in her childhood and there is no family history of either seizures or migraines. No concussions and no CNS inflammations that she knows " about.  There were no sick contacts, rashes, travels out of the country recently.She is from [place] however lives here for long time and had not been out of [place] recently.  LP results from [facility] are pending at this time.  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  Her brother and parents are with her today.  Apparently no PMH and takes no medications.    During her hospital stay patient had abnormal EEG suggestive of increased risk for seizures coming from left temporal lobe and also multiple demyelinating lesions.  She received AED therapy as well as IV steroids high dose 3 days and her symptoms had significantly improved.She was discharged from the hospital on LEV and steroid taper and is here today for follow up.    Interval history 8/16  All symptoms have fully resolved. Mild intermittent headaches 2/10 happen once per week but no seizures, weakness, numbness or vision changes.    Review of Systems   Neurological: Positive for headaches.     Past Medical History:   Diagnosis Date   • Seizure (HCC)     last  one was 2 years ago, not on medication     No past surgical history on file.     No family history on file.     Social History     Socioeconomic History   • Marital status: Single     Spouse name: Not on file   • Number of children: Not on file   • Years of education: Not on file   • Highest education level: Not on file   Occupational History   • Not on file   Social Needs   • Financial resource strain: Not on file   • Food insecurity:     Worry: Not on file     Inability: Not on file   • Transportation needs:     Medical: Not on file     Non-medical: Not on file   Tobacco Use   • Smoking status: Never Smoker   • Smokeless tobacco: Never Used   Substance and Sexual Activity   • Alcohol use: No   • Drug use: No   • Sexual activity: Not Currently     Partners: Male   Lifestyle   •  Physical activity:     Days per week: Not on file     Minutes per session: Not on file   • Stress: Not on file   Relationships   • Social connections:     Talks on phone: Not on file     Gets together: Not on file     Attends Orthodox service: Not on file     Active member of club or organization: Not on file     Attends meetings of clubs or organizations: Not on file     Relationship status: Not on file   • Intimate partner violence:     Fear of current or ex partner: Not on file     Emotionally abused: Not on file     Physically abused: Not on file     Forced sexual activity: Not on file   Other Topics Concern   • Not on file   Social History Narrative   • Not on file     Current Outpatient Medications on File Prior to Visit   Medication Sig Dispense Refill   • asa/apap/caffeine (EXCEDRIN) 250-250-65 MG Tab Take 1 Tab by mouth every 6 hours as needed for Headache.     • predniSONE (DELTASONE) 10 MG Tab Take 1 Tab by mouth every day. 10 mg po bid x 3 days, then 10 mg po daily x 3 days, then 5 mg po daily x 3 days then stop (Patient not taking: Reported on 8/14/2019) 12 Tab 0     No current facility-administered medications on file prior to visit.      Allergies   Allergen Reactions   • Nkda [No Known Drug Allergy]      Physical Exam   Constitutional: Well-developed, well-nourished, good hygiene. Appears stated age.  Cardiovascular: RRR, with no murmurs, rubs or gallops. No carotid bruits.   Respiratory: Lungs CTA B/L, no W/R/R.   Abdomen: Soft Non-tender to Palpation. Non-distended.  Extremities: No peripheral edema, pedal pulses intact.   Skin: Warm, dry, intact. No rashes observed.  Eyes: Sclera anicteric   Funduscopic: Optic discs flat with no evidence of papilledema or pallor.   Neurologic:   Mental Status: Awake, alert, oriented x 3.   Speech: Fluent with normal prosody.   Memory: Able to recall recent and remote events accurately.    Concentration: Attentive. Able to focus on history and follow multi-step  commands.              Fund of Knowledge: Appropriate   Cranial Nerves:    CN II: PERRL. No afferent pupillary defect.    CN III, IV, VI: Good eye closure, EOMI.     CN V: Facial sensation intact and symmetric.     CN VII: No facial asymmetry.     CN VIII: Hearing intact.     CN IX and X: Palate elevates symmetrically. Normal gag reflex.    CN XI: Symmetric shoulder shrug.     CN XII: Tongue midline.    Sensory: Intact light touch, vibration and temperature.    Coordination: No evidence of past-pointing on finger to nose testing, no dysdiadochokinesia. Heel to shin intact.             DTR's: 2+ throughout without clonus.    Babinski: Toes downgoing bilaterally.   Romberg: Negative.   Movements: No tremors observed.   Musculoskeletal:    Strength: 5/5 in upper and lower extremities bilaterally.   Gait: Steady, narrow based.    Tone: Normal bulk and tone.   Joints: No swelling.       MRI brain with and without contrast     COMPARISON:  None.    FINDINGS:  There is no acute infarct.   Approximately 7 mm sized calcification in the left occipital lobe. There is no abnormal contrast enhancement. There is no abnormal signal intensity in the basal ganglia and base of the frontal and temporal lobes.    There are multifocal abnormal T2 hyperintensities in the subcortical and periventricular white matter. None of the lesions demonstrates contrast enhancement.    There is no evidence of hippocampal volume loss or signal change.    The ventricles, cortical sulci and the basal cisterns are unremarkable.    There is no extra-axial fluid collection, hemorrhage or mass.    The skull bones are unremarkable. The paranasal sinuses are clear. The extracranial soft tissue including orbits appear grossly normal.      Impression       1.  There are multifocal abnormal T2 hyperintensities in the subcortical and periventricular white matter. There is no abnormal contrast enhancement. This is new since the previous MRI dated 2/8/2008 These  findings likely represents chronic demyelinating   plaques.  2.  Stable an approximately 6 mm sized parenchymal calcification in the left occipital lobe likely representing chronic sequelae previous granulomatous infection. This is stable since the previous MRI dated 2/8/2008.     Lab Results   Component Value Date/Time    SODIUM 138 07/21/2019 01:59 AM    POTASSIUM 4.4 07/21/2019 01:59 AM    CHLORIDE 106 07/21/2019 01:59 AM    CO2 25 07/21/2019 01:59 AM    GLUCOSE 136 (H) 07/21/2019 01:59 AM    BUN 19 07/21/2019 01:59 AM    CREATININE 0.64 07/21/2019 01:59 AM    No results found for: PROTHROMBTM, INR   Lab Results   Component Value Date/Time    WBC 12.3 (H) 07/19/2019 04:19 AM    RBC 4.82 07/19/2019 04:19 AM    HEMOGLOBIN 13.8 07/19/2019 04:19 AM    HEMATOCRIT 40.4 07/19/2019 04:19 AM    MCV 83.8 07/19/2019 04:19 AM    MCH 28.6 07/19/2019 04:19 AM    MCHC 34.2 07/19/2019 04:19 AM    MPV 10.7 07/19/2019 04:19 AM    NEUTSPOLYS 63.90 07/19/2019 04:19 AM    LYMPHOCYTES 27.40 07/19/2019 04:19 AM    MONOCYTES 6.40 07/19/2019 04:19 AM    EOSINOPHILS 1.30 07/19/2019 04:19 AM    BASOPHILS 0.50 07/19/2019 04:19 AM    HYPOCHROMIA 1+ 12/27/2012 08:49 AM      EEG 7/17 : Intermittent left temporal slowing, rare left temporal sharps, findings suggesting increased seizure risk.      CT TAP 7/18 : Unremarkable, fatty infiltration of liver.     CSF labs    MULTIPLE SCLEROSIS PROFILE   Order: [id number]   Status:  Final result   Visible to patient:  No (Inaccessible in MyChart) Next appt:  08/20/2019 at 11:15 AM in Lab ([facility])    Ref Range & Units 4wk ago  (7/18/19) 4wk ago  (7/18/19)   IgG CSF 0.0 - 6.0 mg/dL 5.0     Serum Albumin 3500 - 5200 mg/dL 3560     CSF Albumin 0 - 35 mg/dL 12     Albumin Index 0.0 - 9.0 ratio 3.4     Cns IgG Syn <=8.0 mg/d 9.8High      IgG Ratio 0.28 - 0.66 ratio 1.11High      Albumin Ratio 0.09 - 0.25 ratio 0.42High      Oligoclonal Bands CSF Negative PositiveAbnormal      Oligoclonal Bands CSF 0 - 1  Bands 12High      Interpretation  See Note             WBC 3   Glucose CSF 56  Protein CSF 31  Gram stain No organisms seen   Cerebrospinal fluid:         No malignant or acute inflammatory cells identified.         Lymphocytes and few monocytes present, consistent with cell          count.  Autoimmune panel negative   Meningitis/encephalitis panel negative   VDRL CSF non-reactive   Myelin basic protein 2.15    Impression and plan     25 yo  female with no past medical history admitted on 7/6/19 with multiple seizures and excruciating headache. MRI brain revealed multiple demyelinating lesions which are in  juxtacortical and periventricular areas and appear to be disseminated in space and time.  There were no new enhancing lesions that took up contrast.  CT C/A/P was negative for malignancy and CSF revealed 12 oligoclonal bands and high IgG index. EEG revealed high risk for seizures coming from left temporal lobe and levetiracetam was started at 500 mg bid after which no further seizures were reported.  Given above my impression is that the patient has Multiple Sclerosis based on revised  Betito criteria. Seizures can be presenting symptoms of Multiple Sclerosis and so can headaches.  Plan  MRI entire neuroaxis with and without contrast to evaluate for any new/prior lesions   Prednisone taper (as scheduled on discharge)  Vitamin D3 5000 IU daily  Check vitamin 1,25 dihydroxy  CBC,CMP   Liver function panel   I discussed the diagnosis and available treatments with the patient.  She prefers PO medications over injectables.  Decision was to start Tecfidera( dimethyl fumarate) as DMT and above labs and imaging should be completed before Tecfidera initiation.  I discussed side effects of Tecfidera including flushing,nausea, diarrhea, vomiting, suppression of immune system and more frequent infections, itching,rednes and rash.  Before initiating dimethyl fumarate we will ask Dr Kenya Knight MS specialist to weigh in on  diagnosis and treatment.    Patient is in agreement with above.    Asia Paz MD PhD   Board certified neurologist   Behavioral Neurologist

## 2019-08-20 ENCOUNTER — HOSPITAL ENCOUNTER (OUTPATIENT)
Dept: LAB | Facility: MEDICAL CENTER | Age: 24
End: 2019-08-20

## 2019-08-20 DIAGNOSIS — G35 MULTIPLE SCLEROSIS (HCC): ICD-10-CM

## 2019-08-20 LAB
ALBUMIN SERPL BCP-MCNC: 4.5 G/DL (ref 3.2–4.9)
ALBUMIN/GLOB SERPL: 1.3 G/DL
ALP SERPL-CCNC: 81 U/L (ref 30–99)
ALT SERPL-CCNC: 97 U/L (ref 2–50)
ANION GAP SERPL CALC-SCNC: 11 MMOL/L (ref 0–11.9)
AST SERPL-CCNC: 57 U/L (ref 12–45)
BASOPHILS # BLD AUTO: 0.7 % (ref 0–1.8)
BASOPHILS # BLD: 0.06 K/UL (ref 0–0.12)
BILIRUB SERPL-MCNC: 0.6 MG/DL (ref 0.1–1.5)
BUN SERPL-MCNC: 13 MG/DL (ref 8–22)
CALCIUM SERPL-MCNC: 9.3 MG/DL (ref 8.5–10.5)
CHLORIDE SERPL-SCNC: 106 MMOL/L (ref 96–112)
CHOLEST SERPL-MCNC: 188 MG/DL (ref 100–199)
CO2 SERPL-SCNC: 23 MMOL/L (ref 20–33)
CREAT SERPL-MCNC: 0.69 MG/DL (ref 0.5–1.4)
EOSINOPHIL # BLD AUTO: 0.17 K/UL (ref 0–0.51)
EOSINOPHIL NFR BLD: 2 % (ref 0–6.9)
ERYTHROCYTE [DISTWIDTH] IN BLOOD BY AUTOMATED COUNT: 43.2 FL (ref 35.9–50)
GLOBULIN SER CALC-MCNC: 3.5 G/DL (ref 1.9–3.5)
GLUCOSE SERPL-MCNC: 82 MG/DL (ref 65–99)
HCT VFR BLD AUTO: 40.7 % (ref 37–47)
HGB BLD-MCNC: 13.4 G/DL (ref 12–16)
IMM GRANULOCYTES # BLD AUTO: 0.06 K/UL (ref 0–0.11)
IMM GRANULOCYTES NFR BLD AUTO: 0.7 % (ref 0–0.9)
LYMPHOCYTES # BLD AUTO: 2.57 K/UL (ref 1–4.8)
LYMPHOCYTES NFR BLD: 30 % (ref 22–41)
MCH RBC QN AUTO: 28.8 PG (ref 27–33)
MCHC RBC AUTO-ENTMCNC: 32.9 G/DL (ref 33.6–35)
MCV RBC AUTO: 87.5 FL (ref 81.4–97.8)
MONOCYTES # BLD AUTO: 0.61 K/UL (ref 0–0.85)
MONOCYTES NFR BLD AUTO: 7.1 % (ref 0–13.4)
NEUTROPHILS # BLD AUTO: 5.11 K/UL (ref 2–7.15)
NEUTROPHILS NFR BLD: 59.5 % (ref 44–72)
NRBC # BLD AUTO: 0 K/UL
NRBC BLD-RTO: 0 /100 WBC
PLATELET # BLD AUTO: 285 K/UL (ref 164–446)
PMV BLD AUTO: 11.5 FL (ref 9–12.9)
POTASSIUM SERPL-SCNC: 3.5 MMOL/L (ref 3.6–5.5)
PROT SERPL-MCNC: 8 G/DL (ref 6–8.2)
RBC # BLD AUTO: 4.65 M/UL (ref 4.2–5.4)
SODIUM SERPL-SCNC: 140 MMOL/L (ref 135–145)
WBC # BLD AUTO: 8.6 K/UL (ref 4.8–10.8)

## 2019-08-20 PROCEDURE — 85025 COMPLETE CBC W/AUTO DIFF WBC: CPT

## 2019-08-20 PROCEDURE — 80053 COMPREHEN METABOLIC PANEL: CPT

## 2019-08-20 PROCEDURE — 82652 VIT D 1 25-DIHYDROXY: CPT

## 2019-08-20 PROCEDURE — 82465 ASSAY BLD/SERUM CHOLESTEROL: CPT

## 2019-08-20 PROCEDURE — 36415 COLL VENOUS BLD VENIPUNCTURE: CPT

## 2019-08-21 LAB — 1,25(OH)2D3 SERPL-MCNC: 39.3 PG/ML (ref 19.9–79.3)

## 2019-09-12 LAB
MYCOBACTERIUM SPEC CULT: NORMAL
RHODAMINE-AURAMINE STN SPEC: NORMAL
SIGNIFICANT IND 70042: NORMAL
SITE SITE: NORMAL
SOURCE SOURCE: NORMAL

## 2020-01-24 NOTE — PROGRESS NOTES
Hospital Medicine Daily Progress Note    Date of Service  7/17/2019    Chief Complaint  24 y.o. female admitted 7/16/2019 with Seizure    Hospital Course  Admitted with Seizure, presented initially as Headache.    Interval Problem Update  Seizure - none here, for EEG  Headache - none currently, LP results pending  Lactic acidosis - trended down  Pyuria - culture pending    Consultants/Specialty  Neurology    Code Status  Full    Disposition  TBD    Review of Systems  Review of Systems   Constitutional: Negative for chills, fever and malaise/fatigue.   HENT: Negative for hearing loss and sore throat.    Eyes: Negative for blurred vision.   Respiratory: Negative for cough, shortness of breath and wheezing.    Cardiovascular: Negative for chest pain, palpitations and leg swelling.   Gastrointestinal: Negative for abdominal pain, diarrhea, heartburn, nausea and vomiting.   Genitourinary: Negative for dysuria, flank pain and hematuria.   Musculoskeletal: Negative for back pain and neck pain.   Skin: Negative for rash.   Neurological: Negative for dizziness, sensory change, speech change, focal weakness, seizures, weakness and headaches.   Psychiatric/Behavioral: The patient is not nervous/anxious.         Physical Exam  Temp:  [36.3 °C (97.3 °F)-36.9 °C (98.5 °F)] 36.7 °C (98 °F)  Pulse:  [] 76  Resp:  [12-18] 18  BP: (110-154)/(67-92) 137/86  SpO2:  [94 %-100 %] 94 %    Physical Exam   Constitutional: She is oriented to person, place, and time. She appears well-developed and well-nourished.   HENT:   Head: Normocephalic and atraumatic.   Eyes: Pupils are equal, round, and reactive to light. Conjunctivae are normal.   Neck: No tracheal deviation present. No thyromegaly present.   Cardiovascular: Normal rate and regular rhythm.    Pulmonary/Chest: Effort normal and breath sounds normal.   Abdominal: Soft. Bowel sounds are normal. She exhibits no distension. There is no tenderness.   Musculoskeletal: She exhibits no  edema.   Lymphadenopathy:     She has no cervical adenopathy.   Neurological: She is alert and oriented to person, place, and time. No cranial nerve deficit.   MMT 5/5   Skin: Skin is warm and dry.   Nursing note and vitals reviewed.      Fluids    Intake/Output Summary (Last 24 hours) at 07/17/19 1404  Last data filed at 07/17/19 0500   Gross per 24 hour   Intake             1206 ml   Output                0 ml   Net             1206 ml       Laboratory  Recent Labs      07/16/19   1507  07/17/19   0621   WBC  16.0*  9.7   RBC  4.55  4.19*   HEMOGLOBIN  12.8  11.8*   HEMATOCRIT  38.1  36.0*   MCV  83.7  85.9   MCH  28.1  28.2   MCHC  33.6  32.8*   RDW  39.5  41.0   PLATELETCT  290  256   MPV  10.7  11.0     Recent Labs      07/16/19   1507  07/17/19   0621   SODIUM  136  139   POTASSIUM  3.9  3.6   CHLORIDE  108  108   CO2  18*  22   GLUCOSE  110*  90   BUN  8  6*   CREATININE  0.59  0.63   CALCIUM  8.0*  8.4*                   Imaging  OUTSIDE IMAGES-DX CHEST   Final Result      OUTSIDE IMAGES-CT HEAD   Final Result      MR-BRAIN-WITH & W/O    (Results Pending)        Assessment/Plan  * Seizure (HCC)- (present on admission)   Assessment & Plan    Keppra  For EEG, MRI brain     Leukocytosis- (present on admission)   Assessment & Plan    Follow cbc       Lactic acidosis- (present on admission)   Assessment & Plan    IVF hydration       Pyuria- (present on admission)   Assessment & Plan    IV Rocephin  Follow culture       Headache- (present on admission)   Assessment & Plan    Follow up LP results  Check DIONE, ESR, CRP, RF, APS     Hypokalemia- (present on admission)   Assessment & Plan    Follow bmp, Mg, Ph            VTE prophylaxis: SCD       yes

## 2020-02-10 ENCOUNTER — HOSPITAL ENCOUNTER (INPATIENT)
Facility: MEDICAL CENTER | Age: 25
LOS: 1 days | DRG: 101 | End: 2020-02-11
Attending: EMERGENCY MEDICINE | Admitting: HOSPITALIST
Payer: COMMERCIAL

## 2020-02-10 ENCOUNTER — APPOINTMENT (OUTPATIENT)
Dept: RADIOLOGY | Facility: MEDICAL CENTER | Age: 25
DRG: 101 | End: 2020-02-10
Attending: EMERGENCY MEDICINE

## 2020-02-10 DIAGNOSIS — R51.9 ACUTE NONINTRACTABLE HEADACHE, UNSPECIFIED HEADACHE TYPE: Primary | ICD-10-CM

## 2020-02-10 DIAGNOSIS — R56.9 SEIZURE (HCC): ICD-10-CM

## 2020-02-10 DIAGNOSIS — G35 MULTIPLE SCLEROSIS (HCC): ICD-10-CM

## 2020-02-10 PROBLEM — E87.1 HYPONATREMIA: Status: ACTIVE | Noted: 2020-02-10

## 2020-02-10 PROBLEM — R00.0 TACHYCARDIA: Status: ACTIVE | Noted: 2020-02-10

## 2020-02-10 PROBLEM — R73.9 HYPERGLYCEMIA: Status: ACTIVE | Noted: 2020-02-10

## 2020-02-10 PROBLEM — E66.9 OBESITY: Status: ACTIVE | Noted: 2020-02-10

## 2020-02-10 LAB
ALBUMIN SERPL BCP-MCNC: 4.4 G/DL (ref 3.2–4.9)
ALBUMIN/GLOB SERPL: 1.2 G/DL
ALP SERPL-CCNC: 88 U/L (ref 30–99)
ALT SERPL-CCNC: 42 U/L (ref 2–50)
ANION GAP SERPL CALC-SCNC: 9 MMOL/L (ref 0–11.9)
AST SERPL-CCNC: 37 U/L (ref 12–45)
BASOPHILS # BLD AUTO: 0.5 % (ref 0–1.8)
BASOPHILS # BLD: 0.08 K/UL (ref 0–0.12)
BILIRUB SERPL-MCNC: 0.5 MG/DL (ref 0.1–1.5)
BUN SERPL-MCNC: 13 MG/DL (ref 8–22)
CALCIUM SERPL-MCNC: 9.8 MG/DL (ref 8.5–10.5)
CHLORIDE SERPL-SCNC: 102 MMOL/L (ref 96–112)
CO2 SERPL-SCNC: 23 MMOL/L (ref 20–33)
CREAT SERPL-MCNC: 0.67 MG/DL (ref 0.5–1.4)
EKG IMPRESSION: NORMAL
EOSINOPHIL # BLD AUTO: 0.02 K/UL (ref 0–0.51)
EOSINOPHIL NFR BLD: 0.1 % (ref 0–6.9)
ERYTHROCYTE [DISTWIDTH] IN BLOOD BY AUTOMATED COUNT: 39.4 FL (ref 35.9–50)
EST. AVERAGE GLUCOSE BLD GHB EST-MCNC: 117 MG/DL
GLOBULIN SER CALC-MCNC: 3.8 G/DL (ref 1.9–3.5)
GLUCOSE SERPL-MCNC: 137 MG/DL (ref 65–99)
HBA1C MFR BLD: 5.7 % (ref 0–5.6)
HCG SERPL QL: NEGATIVE
HCT VFR BLD AUTO: 42.8 % (ref 37–47)
HGB BLD-MCNC: 14.2 G/DL (ref 12–16)
IMM GRANULOCYTES # BLD AUTO: 0.05 K/UL (ref 0–0.11)
IMM GRANULOCYTES NFR BLD AUTO: 0.3 % (ref 0–0.9)
LYMPHOCYTES # BLD AUTO: 1.54 K/UL (ref 1–4.8)
LYMPHOCYTES NFR BLD: 10.1 % (ref 22–41)
MAGNESIUM SERPL-MCNC: 2 MG/DL (ref 1.5–2.5)
MCH RBC QN AUTO: 28.6 PG (ref 27–33)
MCHC RBC AUTO-ENTMCNC: 33.2 G/DL (ref 33.6–35)
MCV RBC AUTO: 86.3 FL (ref 81.4–97.8)
MONOCYTES # BLD AUTO: 0.59 K/UL (ref 0–0.85)
MONOCYTES NFR BLD AUTO: 3.9 % (ref 0–13.4)
NEUTROPHILS # BLD AUTO: 13.02 K/UL (ref 2–7.15)
NEUTROPHILS NFR BLD: 85.1 % (ref 44–72)
NRBC # BLD AUTO: 0 K/UL
NRBC BLD-RTO: 0 /100 WBC
PLATELET # BLD AUTO: 288 K/UL (ref 164–446)
PMV BLD AUTO: 11.2 FL (ref 9–12.9)
POTASSIUM SERPL-SCNC: 4.3 MMOL/L (ref 3.6–5.5)
PROT SERPL-MCNC: 8.2 G/DL (ref 6–8.2)
RBC # BLD AUTO: 4.96 M/UL (ref 4.2–5.4)
SODIUM SERPL-SCNC: 134 MMOL/L (ref 135–145)
WBC # BLD AUTO: 15.3 K/UL (ref 4.8–10.8)

## 2020-02-10 PROCEDURE — 4A10X4Z MONITORING OF CENTRAL NERVOUS ELECTRICAL ACTIVITY, EXTERNAL APPROACH: ICD-10-PCS | Performed by: PSYCHIATRY & NEUROLOGY

## 2020-02-10 PROCEDURE — 80053 COMPREHEN METABOLIC PANEL: CPT

## 2020-02-10 PROCEDURE — 700111 HCHG RX REV CODE 636 W/ 250 OVERRIDE (IP): Performed by: EMERGENCY MEDICINE

## 2020-02-10 PROCEDURE — 83036 HEMOGLOBIN GLYCOSYLATED A1C: CPT

## 2020-02-10 PROCEDURE — 96375 TX/PRO/DX INJ NEW DRUG ADDON: CPT

## 2020-02-10 PROCEDURE — 96374 THER/PROPH/DIAG INJ IV PUSH: CPT

## 2020-02-10 PROCEDURE — 700111 HCHG RX REV CODE 636 W/ 250 OVERRIDE (IP)

## 2020-02-10 PROCEDURE — 95819 EEG AWAKE AND ASLEEP: CPT | Mod: 26 | Performed by: PSYCHIATRY & NEUROLOGY

## 2020-02-10 PROCEDURE — 99285 EMERGENCY DEPT VISIT HI MDM: CPT

## 2020-02-10 PROCEDURE — 85025 COMPLETE CBC W/AUTO DIFF WBC: CPT

## 2020-02-10 PROCEDURE — 95819 EEG AWAKE AND ASLEEP: CPT | Performed by: PSYCHIATRY & NEUROLOGY

## 2020-02-10 PROCEDURE — 94760 N-INVAS EAR/PLS OXIMETRY 1: CPT

## 2020-02-10 PROCEDURE — 70450 CT HEAD/BRAIN W/O DYE: CPT

## 2020-02-10 PROCEDURE — 93005 ELECTROCARDIOGRAM TRACING: CPT | Performed by: EMERGENCY MEDICINE

## 2020-02-10 PROCEDURE — 83735 ASSAY OF MAGNESIUM: CPT

## 2020-02-10 PROCEDURE — 84703 CHORIONIC GONADOTROPIN ASSAY: CPT

## 2020-02-10 PROCEDURE — 99223 1ST HOSP IP/OBS HIGH 75: CPT | Performed by: HOSPITALIST

## 2020-02-10 PROCEDURE — A9270 NON-COVERED ITEM OR SERVICE: HCPCS | Performed by: HOSPITALIST

## 2020-02-10 PROCEDURE — 700111 HCHG RX REV CODE 636 W/ 250 OVERRIDE (IP): Performed by: HOSPITALIST

## 2020-02-10 PROCEDURE — 700105 HCHG RX REV CODE 258: Performed by: HOSPITALIST

## 2020-02-10 PROCEDURE — 770020 HCHG ROOM/CARE - TELE (206)

## 2020-02-10 PROCEDURE — 700102 HCHG RX REV CODE 250 W/ 637 OVERRIDE(OP): Performed by: HOSPITALIST

## 2020-02-10 PROCEDURE — 700105 HCHG RX REV CODE 258: Performed by: EMERGENCY MEDICINE

## 2020-02-10 RX ORDER — LORAZEPAM 2 MG/ML
INJECTION INTRAMUSCULAR
Status: COMPLETED
Start: 2020-02-10 | End: 2020-02-10

## 2020-02-10 RX ORDER — SODIUM CHLORIDE 9 MG/ML
INJECTION, SOLUTION INTRAVENOUS CONTINUOUS
Status: DISCONTINUED | OUTPATIENT
Start: 2020-02-10 | End: 2020-02-11 | Stop reason: HOSPADM

## 2020-02-10 RX ORDER — PROMETHAZINE HYDROCHLORIDE 25 MG/1
12.5-25 TABLET ORAL EVERY 4 HOURS PRN
Status: DISCONTINUED | OUTPATIENT
Start: 2020-02-10 | End: 2020-02-11 | Stop reason: HOSPADM

## 2020-02-10 RX ORDER — ONDANSETRON 2 MG/ML
4 INJECTION INTRAMUSCULAR; INTRAVENOUS EVERY 4 HOURS PRN
Status: DISCONTINUED | OUTPATIENT
Start: 2020-02-10 | End: 2020-02-11 | Stop reason: HOSPADM

## 2020-02-10 RX ORDER — ONDANSETRON 4 MG/1
4 TABLET, ORALLY DISINTEGRATING ORAL EVERY 4 HOURS PRN
Status: DISCONTINUED | OUTPATIENT
Start: 2020-02-10 | End: 2020-02-11 | Stop reason: HOSPADM

## 2020-02-10 RX ORDER — PROCHLORPERAZINE EDISYLATE 5 MG/ML
5-10 INJECTION INTRAMUSCULAR; INTRAVENOUS EVERY 4 HOURS PRN
Status: DISCONTINUED | OUTPATIENT
Start: 2020-02-10 | End: 2020-02-11 | Stop reason: HOSPADM

## 2020-02-10 RX ORDER — MIDAZOLAM HYDROCHLORIDE 1 MG/ML
5 INJECTION INTRAMUSCULAR; INTRAVENOUS ONCE
Status: COMPLETED | OUTPATIENT
Start: 2020-02-10 | End: 2020-02-10

## 2020-02-10 RX ORDER — METOCLOPRAMIDE HYDROCHLORIDE 5 MG/ML
10 INJECTION INTRAMUSCULAR; INTRAVENOUS ONCE
Status: COMPLETED | OUTPATIENT
Start: 2020-02-10 | End: 2020-02-10

## 2020-02-10 RX ORDER — SODIUM CHLORIDE 9 MG/ML
1000 INJECTION, SOLUTION INTRAVENOUS ONCE
Status: COMPLETED | OUTPATIENT
Start: 2020-02-10 | End: 2020-02-10

## 2020-02-10 RX ORDER — MIDAZOLAM HYDROCHLORIDE 1 MG/ML
INJECTION INTRAMUSCULAR; INTRAVENOUS
Status: COMPLETED
Start: 2020-02-10 | End: 2020-02-10

## 2020-02-10 RX ORDER — DEXAMETHASONE SODIUM PHOSPHATE 4 MG/ML
12 INJECTION, SOLUTION INTRA-ARTICULAR; INTRALESIONAL; INTRAMUSCULAR; INTRAVENOUS; SOFT TISSUE ONCE
Status: COMPLETED | OUTPATIENT
Start: 2020-02-10 | End: 2020-02-10

## 2020-02-10 RX ORDER — VITAMIN B COMPLEX
1000 TABLET ORAL DAILY
Status: DISCONTINUED | OUTPATIENT
Start: 2020-02-10 | End: 2020-02-11 | Stop reason: HOSPADM

## 2020-02-10 RX ORDER — ACETAMINOPHEN 325 MG/1
650 TABLET ORAL EVERY 6 HOURS PRN
Status: DISCONTINUED | OUTPATIENT
Start: 2020-02-10 | End: 2020-02-11 | Stop reason: HOSPADM

## 2020-02-10 RX ORDER — POLYETHYLENE GLYCOL 3350 17 G/17G
1 POWDER, FOR SOLUTION ORAL
Status: DISCONTINUED | OUTPATIENT
Start: 2020-02-10 | End: 2020-02-11 | Stop reason: HOSPADM

## 2020-02-10 RX ORDER — LORAZEPAM 2 MG/ML
2 INJECTION INTRAMUSCULAR
Status: DISCONTINUED | OUTPATIENT
Start: 2020-02-10 | End: 2020-02-11 | Stop reason: HOSPADM

## 2020-02-10 RX ORDER — ONDANSETRON 4 MG/1
4 TABLET, ORALLY DISINTEGRATING ORAL EVERY 4 HOURS PRN
Qty: 10 TAB | Refills: 0 | Status: SHIPPED | OUTPATIENT
Start: 2020-02-10 | End: 2020-02-11 | Stop reason: SDUPTHER

## 2020-02-10 RX ORDER — BISACODYL 10 MG
10 SUPPOSITORY, RECTAL RECTAL
Status: DISCONTINUED | OUTPATIENT
Start: 2020-02-10 | End: 2020-02-11 | Stop reason: HOSPADM

## 2020-02-10 RX ORDER — AMOXICILLIN 250 MG
2 CAPSULE ORAL 2 TIMES DAILY
Status: DISCONTINUED | OUTPATIENT
Start: 2020-02-10 | End: 2020-02-11 | Stop reason: HOSPADM

## 2020-02-10 RX ORDER — BUTALBITAL, ACETAMINOPHEN AND CAFFEINE 50; 325; 40 MG/1; MG/1; MG/1
1 TABLET ORAL EVERY 4 HOURS PRN
Qty: 10 TAB | Refills: 0 | Status: SHIPPED | OUTPATIENT
Start: 2020-02-10 | End: 2020-02-11 | Stop reason: SDUPTHER

## 2020-02-10 RX ORDER — DIPHENHYDRAMINE HYDROCHLORIDE 50 MG/ML
25 INJECTION INTRAMUSCULAR; INTRAVENOUS ONCE
Status: COMPLETED | OUTPATIENT
Start: 2020-02-10 | End: 2020-02-10

## 2020-02-10 RX ORDER — KETOROLAC TROMETHAMINE 30 MG/ML
30 INJECTION, SOLUTION INTRAMUSCULAR; INTRAVENOUS ONCE
Status: COMPLETED | OUTPATIENT
Start: 2020-02-10 | End: 2020-02-10

## 2020-02-10 RX ORDER — LORAZEPAM 2 MG/ML
2 INJECTION INTRAMUSCULAR ONCE
Status: COMPLETED | OUTPATIENT
Start: 2020-02-10 | End: 2020-02-10

## 2020-02-10 RX ORDER — METHYLPREDNISOLONE 4 MG/1
TABLET ORAL
Qty: 1 PACKAGE | Refills: 0 | Status: SHIPPED | OUTPATIENT
Start: 2020-02-10 | End: 2020-02-11 | Stop reason: SDUPTHER

## 2020-02-10 RX ORDER — PROMETHAZINE HYDROCHLORIDE 25 MG/1
12.5-25 SUPPOSITORY RECTAL EVERY 4 HOURS PRN
Status: DISCONTINUED | OUTPATIENT
Start: 2020-02-10 | End: 2020-02-11 | Stop reason: HOSPADM

## 2020-02-10 RX ADMIN — DEXAMETHASONE SODIUM PHOSPHATE 12 MG: 4 INJECTION, SOLUTION INTRA-ARTICULAR; INTRALESIONAL; INTRAMUSCULAR; INTRAVENOUS; SOFT TISSUE at 07:39

## 2020-02-10 RX ADMIN — LORAZEPAM 2 MG: 2 INJECTION INTRAMUSCULAR at 10:30

## 2020-02-10 RX ADMIN — SODIUM CHLORIDE: 9 INJECTION, SOLUTION INTRAVENOUS at 16:38

## 2020-02-10 RX ADMIN — MIDAZOLAM HYDROCHLORIDE: 1 INJECTION, SOLUTION INTRAMUSCULAR; INTRAVENOUS at 10:44

## 2020-02-10 RX ADMIN — SODIUM CHLORIDE 500 MG: 9 INJECTION, SOLUTION INTRAVENOUS at 10:56

## 2020-02-10 RX ADMIN — MELATONIN 1000 UNITS: at 17:50

## 2020-02-10 RX ADMIN — METOCLOPRAMIDE 10 MG: 5 INJECTION, SOLUTION INTRAMUSCULAR; INTRAVENOUS at 07:43

## 2020-02-10 RX ADMIN — KETOROLAC TROMETHAMINE 30 MG: 30 INJECTION, SOLUTION INTRAMUSCULAR at 09:25

## 2020-02-10 RX ADMIN — MIDAZOLAM HYDROCHLORIDE: 1 INJECTION INTRAMUSCULAR; INTRAVENOUS at 10:44

## 2020-02-10 RX ADMIN — SODIUM CHLORIDE 1000 ML: 9 INJECTION, SOLUTION INTRAVENOUS at 07:35

## 2020-02-10 RX ADMIN — SODIUM CHLORIDE 500 MG: 9 INJECTION, SOLUTION INTRAVENOUS at 17:49

## 2020-02-10 RX ADMIN — SODIUM CHLORIDE 1000 ML: 9 INJECTION, SOLUTION INTRAVENOUS at 11:20

## 2020-02-10 RX ADMIN — DIPHENHYDRAMINE HYDROCHLORIDE 25 MG: 50 INJECTION INTRAMUSCULAR; INTRAVENOUS at 07:41

## 2020-02-10 RX ADMIN — LORAZEPAM 2 MG: 2 INJECTION INTRAMUSCULAR; INTRAVENOUS at 10:30

## 2020-02-10 ASSESSMENT — LIFESTYLE VARIABLES
CONSUMPTION TOTAL: NEGATIVE
HOW MANY TIMES IN THE PAST YEAR HAVE YOU HAD 5 OR MORE DRINKS IN A DAY: 0
AVERAGE NUMBER OF DAYS PER WEEK YOU HAVE A DRINK CONTAINING ALCOHOL: 0
HAVE PEOPLE ANNOYED YOU BY CRITICIZING YOUR DRINKING: NO
TOTAL SCORE: 0
TOTAL SCORE: 0
ALCOHOL_USE: NO
TOTAL SCORE: 0
EVER FELT BAD OR GUILTY ABOUT YOUR DRINKING: NO
HAVE YOU EVER FELT YOU SHOULD CUT DOWN ON YOUR DRINKING: NO
EVER HAD A DRINK FIRST THING IN THE MORNING TO STEADY YOUR NERVES TO GET RID OF A HANGOVER: NO
EVER_SMOKED: NEVER
ON A TYPICAL DAY WHEN YOU DRINK ALCOHOL HOW MANY DRINKS DO YOU HAVE: 0

## 2020-02-10 ASSESSMENT — PATIENT HEALTH QUESTIONNAIRE - PHQ9
2. FEELING DOWN, DEPRESSED, IRRITABLE, OR HOPELESS: NOT AT ALL
1. LITTLE INTEREST OR PLEASURE IN DOING THINGS: NOT AT ALL
SUM OF ALL RESPONSES TO PHQ9 QUESTIONS 1 AND 2: 0

## 2020-02-10 NOTE — ED NOTES
Report taken, care assumed. Provider at bedside to perform initial assessment, awaiting orders. Family in room with patient.

## 2020-02-10 NOTE — PROCEDURES
ROUTINE ELECTROENCEPHALOGRAM REPORT      Referring provider: Dr. Africa Ramires    DOS: 2/10/2020 (total recording of 25 minutes)    INDICATION:  Rosanna Ramires 24 y.o. female presenting with headaches, seizure.    CURRENT ANTIEPILEPTIC REGIMEN: Levetiracetam, Versed.    TECHNIQUE: 30 channel routine electroencephalogram (EEG) was performed in accordance with the international 10-20 system. The study was reviewed in bipolar and referential montages. The recording examined the patient during wakeful and drowsy/sleep state(s).     DESCRIPTION OF THE RECORD:  During the wakefulness, the background showed a symmetrical 9 hz alpha activity posteriorly with amplitude of 70 mV.  There was reactivity to eye closure/opening.  A normal anterior-posterior gradient was noted with faster beta frequencies seen anteriorly.  During drowsiness, theta/delta frequencies were seen.    During the sleep state, background shows diffuse high-amplitude 4-5 Hz delta activity.  Symmetrical high-amplitude sleep spindles and vertex sharps were seen in the leads over the central regions.     ACTIVATION PROCEDURES:   Intermittent Photic stimulation was performed in a stepwise fashion from 1 to 30 Hz and elicited a normal response (photic driving), most noticeable in the posterior leads.      ICTAL AND/OR INTERICTAL FINDINGS:   No focal or generalized epileptiform activity noted. No regional slowing was seen during this routine study.  No clinical events or seizures were reported or recorded during the study.     EKG: sampling of the EKG recording demonstrated sinus rhythm.       INTERPRETATION:  This is a normal routine EEG recording in the awake, drowsy, and sleep state(s).  Clinical correlation is recommended.    Note: A normal EEG does not rule out epilepsy.  If the clinical suspicion remains high for seizures, a prolonged recording to capture clinical or subclinical events may be helpful.        Tai Rabago MD   Epilepsy and  Neurodiagnostics.   Clinical  of Neurology [facility].   Diplomate in Neurology, Epilepsy, and Electrodiagnostic Medicine.   Office: [phone]  Fax: [phone]

## 2020-02-10 NOTE — ED TRIAGE NOTES
"Chief Complaint   Patient presents with   • Headache     for the last two weeks pt has had intermittent headaches, yesterday the headache came back on her L side of her head and photophobia that has not gone away. Pt denies n/v, numbness or tingling at this time. Pt took advil around 1900 yesterday with slight relief.        /88   Pulse 85   Temp 36.6 °C (97.9 °F) (Oral)   Resp 16   Ht 1.676 m (5' 6\")   Wt 93 kg (205 lb)   LMP 01/15/2020 (Approximate)   SpO2 100%   BMI 33.09 kg/m²      Pt is aaox4, responding with clear speech, neuro in tact. FAST negative, pupils PERRLA.    Pt Informed regarding triage process and verbalized understanding to inform triage tech or RN for any changes in condition.  Placed in lobby.    "

## 2020-02-10 NOTE — ED NOTES
Patient had a witnessed seizure. Provider at bedside during seizure. Medicated per MAR, awaiting Keppra infusion from pharmacy. Patient has had similar episodes previously and was diagnosed with possible MS, patient was unable to follow-up with her neurologist due to financial issues. Patient bit tongue during seizure, no further injuries noted at this time. Family remains at bedside, given update by provider. Patient repositioned in bed, Non-rebreather placed on patient post seizure. Will continue to monitor.

## 2020-02-10 NOTE — ED NOTES
Sleeping, family at bedside. Breathing nonlabored, regular. Family denies concerns, needs at this time.

## 2020-02-10 NOTE — ED NOTES
Family given update on plan of care. Patient repositioning self. Wakes to verbal stimuli, alert and oriented.

## 2020-02-10 NOTE — ED PROVIDER NOTES
ED Provider Note    CHIEF COMPLAINT  Chief Complaint   Patient presents with   • Headache     for the last two weeks pt has had intermittent headaches, yesterday the headache came back on her L side of her head and photophobia that has not gone away. Pt denies n/v, numbness or tingling at this time. Pt took advil around 1900 yesterday with slight relief.        HPI  Rosanna Ramires is a 24 y.o. female who presents to the emergency department through triage with family for headache.  Patient describes chronic headaches, although persistent and intractable for 2 weeks.  Taking ibuprofen around-the-clock with only temporary improvement.  Now overnight, unable to sleep due to discomfort.  10 out of 10, left sided, throbbing.  Photophobia without other visual changes.  Patient complains of nausea with vomiting.  Generalized weakness, although no focal weakness, paresthesias.  No fever or chills.  No neck pain or stiffness.    History of headaches, when severe, previously associated with seizure.  Compliant with Keppra 500 mg twice daily.  Denies recent illness, cough, congestion, fever.    REVIEW OF SYSTEMS  See HPI for further details. All other systems are negative.    PAST MEDICAL HISTORY   has a past medical history of Seizure (HCC).    SOCIAL HISTORY  Social History     Tobacco Use   • Smoking status: Never Smoker   • Smokeless tobacco: Never Used   Substance and Sexual Activity   • Alcohol use: No   • Drug use: No   • Sexual activity: Not Currently     Partners: Male       SURGICAL HISTORY  patient denies any surgical history    CURRENT MEDICATIONS  Home Medications     Reviewed by Shelley Craig R.N. (Registered Nurse) on 02/10/20 at 0534  Med List Status: Partial   Medication Last Dose Status   asa/apap/caffeine (EXCEDRIN) 250-250-65 MG Tab not taking Active   Cholecalciferol (VITAMIN D-3) 5000 units Tab  Active   levETIRAcetam (KEPPRA) 500 MG Tab  Active   predniSONE (DELTASONE) 10 MG Tab  Active           "      ALLERGIES  Allergies   Allergen Reactions   • Nkda [No Known Drug Allergy]        PHYSICAL EXAM  VITAL SIGNS: /59   Pulse (!) 105   Temp 36.6 °C (97.9 °F) (Oral)   Resp (!) 24   Ht 1.676 m (5' 6\")   Wt 93 kg (205 lb)   LMP 01/15/2020 (Approximate)   SpO2 91%   BMI 33.09 kg/m²   Pulse ox interpretation: I interpret this pulse ox as normal.  Constitutional: Alert in no apparent distress.  HENT: Normocephalic, atraumatic. Bilateral external ears normal, Nose normal. Moist mucous membranes.    Eyes: Pupils are equal and reactive, Conjunctiva normal.  EOMI, no nystagmus.  Neck: Normal range of motion, Supple, non-tender. No stridor.   Lymphatic: No lymphadenopathy noted.   Cardiovascular: Regular rate and rhythm, no murmurs. Distal pulses intact.  No peripheral edema.  Thorax & Lungs: Normal breath sounds.  No wheezing/rales/ronchi. No increased work of breathing, clipped speech or retractions.   Abdomen: Soft, non-distended, non-tender to palpation.   Skin: Warm, Dry, No erythema, No rash.   Musculoskeletal: Good range of motion in all major joints.   Neurologic: Alert and oriented x4.  Speech clear and cohesive.  Cranial nerves II through XII intact bilaterally.  5 out of 5 strength in 4 extremities with proximal and distal muscle groups.  No pronator drift.  Finger-nose intact bilaterally.  Straight leg raise intact bilaterally  Psychiatric: Affect normal, Judgment normal, Mood normal.       DIAGNOSTIC STUDIES / PROCEDURES    LABS  Results for orders placed or performed during the hospital encounter of 02/10/20   CBC WITH DIFFERENTIAL   Result Value Ref Range    WBC 15.3 (H) 4.8 - 10.8 K/uL    RBC 4.96 4.20 - 5.40 M/uL    Hemoglobin 14.2 12.0 - 16.0 g/dL    Hematocrit 42.8 37.0 - 47.0 %    MCV 86.3 81.4 - 97.8 fL    MCH 28.6 27.0 - 33.0 pg    MCHC 33.2 (L) 33.6 - 35.0 g/dL    RDW 39.4 35.9 - 50.0 fL    Platelet Count 288 164 - 446 K/uL    MPV 11.2 9.0 - 12.9 fL    Neutrophils-Polys 85.10 (H) 44.00 " - 72.00 %    Lymphocytes 10.10 (L) 22.00 - 41.00 %    Monocytes 3.90 0.00 - 13.40 %    Eosinophils 0.10 0.00 - 6.90 %    Basophils 0.50 0.00 - 1.80 %    Immature Granulocytes 0.30 0.00 - 0.90 %    Nucleated RBC 0.00 /100 WBC    Neutrophils (Absolute) 13.02 (H) 2.00 - 7.15 K/uL    Lymphs (Absolute) 1.54 1.00 - 4.80 K/uL    Monos (Absolute) 0.59 0.00 - 0.85 K/uL    Eos (Absolute) 0.02 0.00 - 0.51 K/uL    Baso (Absolute) 0.08 0.00 - 0.12 K/uL    Immature Granulocytes (abs) 0.05 0.00 - 0.11 K/uL    NRBC (Absolute) 0.00 K/uL   COMP METABOLIC PANEL   Result Value Ref Range    Sodium 134 (L) 135 - 145 mmol/L    Potassium 4.3 3.6 - 5.5 mmol/L    Chloride 102 96 - 112 mmol/L    Co2 23 20 - 33 mmol/L    Anion Gap 9.0 0.0 - 11.9    Glucose 137 (H) 65 - 99 mg/dL    Bun 13 8 - 22 mg/dL    Creatinine 0.67 0.50 - 1.40 mg/dL    Calcium 9.8 8.5 - 10.5 mg/dL    AST(SGOT) 37 12 - 45 U/L    ALT(SGPT) 42 2 - 50 U/L    Alkaline Phosphatase 88 30 - 99 U/L    Total Bilirubin 0.5 0.1 - 1.5 mg/dL    Albumin 4.4 3.2 - 4.9 g/dL    Total Protein 8.2 6.0 - 8.2 g/dL    Globulin 3.8 (H) 1.9 - 3.5 g/dL    A-G Ratio 1.2 g/dL   HCG QUAL SERUM   Result Value Ref Range    Beta-Hcg Qualitative Serum Negative Negative   ESTIMATED GFR   Result Value Ref Range    GFR If African American >60 >60 mL/min/1.73 m 2    GFR If Non African American >60 >60 mL/min/1.73 m 2   EKG   Result Value Ref Range    Report       [facility] Emergency Dept.    Test Date:  2020-02-10  Pt Name:    PEDRITO REA          Department: ER  MRN:        [id number]                      Room:       RD 08  Gender:     Female                       Technician: [id number]  :        1995                   Requested By:BING CORBETT  Order #:    [id number]                    Reading MD: BING CORBETT, DO    Measurements  Intervals                                Axis  Rate:       119                          P:          92  HI:         176                           QRS:        27  QRSD:       86                           T:          233  QT:         308  QTc:        434    Interpretive Statements  SINUS TACHYCARDIA  BORDERLINE REPOLARIZATION ABNORMALITY  BASELINE WANDER IN LEAD(S) II,V1,V3,V4,V5,V6  Compared to ECG 07/17/2019 18:42:30  Sinus rhythm no longer present  Electronically Signed On 2- 10:37:31 PST by BING CORBETT,            RADIOLOGY  CT-HEAD W/O   Final Result      No CT evidence of acute infarct, hemorrhage or mass.          COURSE & MEDICAL DECISION MAKING  Nursing notes and vital signs were reviewed. (See chart for details)  The patients  records were reviewed, history was obtained from the patient;     Medical record review: Discharge summary 7/21/2019 after evaluation for seizure and headache.  Apparently patient had had episode of headache with seizure years prior.  Patient was started on Keppra.  MRI was suggestive of demyelinating plaques and started on IV steroids as well.  To follow-up outpatient neurology.  Seen by neurology, , 8/14/2019.  Symptoms well controlled since starting Keppra.  No further headaches or seizures.  Abnormal EEG suggestive of increased risk for seizures coming from the left temporal lobe and also multiple demyelinating lesions.  CSF revealed 12 oligoclonal bands and high IgG index.  Impression is that patient has multiple sclerosis.  Seizures could be present him symptoms and soaking headaches.  Plan to MRI entire neuro axis with and without contrast to evaluate for any new/prior lesions.  Prednisone taper.  Vitamin D3 daily.  Check vitamin 1, 25 dihydroxy.  CBC, CMP.  Liver function panel.  Discussed diagnosis and available treatments which include Tecfindera although above lab are needed, and final recommendations encouraged by Dr. Knight.    0815 -patient resting much more comfortably after Reglan, Benadryl, Decadron.    9:13 AM patient evaluated bedside.  Patient states headache is returning,  requesting more medication.  Remains neurologically intact and nonfocal.  She ambulated to the bathroom independently.  Toradol ordered.  Labs are unrevealing, CT within normal limits.  Neurology paged.    3992 -Dr. Fuentes is aware of patient history, presentation, exam and work-up today.  No indication for admission.  Agrees with Medrol dose pack.  Fioricet for headache.  Strongly encourages outpatient neurology work-up.    1015 -patient and family aware of neurology recommendations to proceed with discharge.  Patient's headache again improved with Toradol.    10:42 AM all discharge was being prepped, I was called to room for seizure activity.  Patient with tonic-clonic seizure lasting 2 to 3 minutes.  Oral trauma, tongue bite without gross laceration.  Patient remains postictal at this time.  She received 2 mg of Ativan initially, now 5 mg of Versed intravenously for combative behavior.  Keppra 500 mg will be given IV.  Patient will be admitted for intractable headache, seizure, suspected MS flare.  Patient has been lost to outpatient intervention secondary to accessibility and affordability.    10:47 AM Dr. Leger is aware of the patient agreeable to consultation.    1115 -patient reevaluated at bedside.  Sleeping but arousable, appropriate.  Opens eyes to name.  Follows commands,  strength equal.      FINAL IMPRESSION  (R51) Acute nonintractable headache, unspecified headache type  (primary encounter diagnosis)  (R56.9) Seizure (HCC)  (G35) Multiple sclerosis (HCC)      Electronically signed by: Hailey Flower D.O., 2/10/2020 9:13 AM      This dictation was created using voice recognition software. The accuracy of the dictation is limited to the abilities of the software. I expect there may be some errors of grammar and possibly content. The nursing notes were reviewed and certain aspects of this information were incorporated into this note.

## 2020-02-10 NOTE — H&P
Hospital Medicine History & Physical Note    Date of Service  2/10/2020    Primary Care Physician  Pcp Pt States None    Consultants  none    Code Status  Full code    Chief Complaint  headache    History of Presenting Illness  24 y.o. female who presented 2/10/2020 with pmh of MS is coming today c/o headache in the ER had CT head neg for acute finding, patient received reglan, benadryl and decadron for headache that improved her symptoms patient lost f/u with neurology as outpatient for he MS, ERP discussed case with neuro and recommended to dc home and f/u as outpatient unfortunately patient developed a seizure she received ativan and midazolam in the ER along with iv keppra patient when I saw her is not able to give information due to post ictal/medication effect, she is able to open her eye and grab my hands but falls asleep, information is coming for family, patient was seen by neuro and was in hospital where she was diagnosed with MS had lp that showed CSF revealed 12 oligoclonal bands and high IgG index and also positive finding on MRI brain she was stabilized and able to be dc she had an appointment with neuro where MRI spine was recommended and also to start on Tecfidera( dimethyl fumarate) as DMT, Vit d 5000, unfortunately did not continue following since she could not afford plan of care, as per patient's family she has been doing ok until a couple of weeks ago when she started having headache 10/10, left sided throbbing, seeing dark spots on left eye but no double vision or lost of vision, no focal weakness, no numbness or tingling, no neck pain no n/v/d/c no fever.   Patient will be admitted to neuro floor, I have discussed case with ERP and neurology, discussed plan of care with patient's family all questions answered.     Review of Systems  Review of Systems   Unable to perform ROS: Acuity of condition       Past Medical History   has a past medical history of Seizure (HCC). She also has no past  medical history of Addisons disease (HCC), Adrenal disorder (HCC), Allergy, Anemia, Anxiety, Arrhythmia, Arthritis, ASTHMA, Blood transfusion, Cancer (HCC), CATARACT, CHF (congestive heart failure) (HCC), Clotting disorder (HCC), COPD, Cushings syndrome (HCC), Depression, Diabetes, Diabetic neuropathy (HCC), EMPHYSEMA, GERD (gastroesophageal reflux disease), Glaucoma, Goiter, Headache(784.0), Heart attack (HCC), Heart murmur, HIV (human immunodeficiency virus infection), Hyperlipidemia, Hypertension, IBD (inflammatory bowel disease), Kidney disease, Meningitis, Migraine, Muscle disorder, OSTEOPOROSIS, Parathyroid disorder (HCC), Pituitary disease (HCC), Sickle cell disease (HCC), Stroke (HCC), Substance abuse (HCC), Thyroid disease, Tuberculosis, Ulcer, or Urinary tract infection, site not specified.    Surgical History   has no past surgical history on file.     Family History  No family history of MD or neurologic problems.     Social History   reports that she has never smoked. She has never used smokeless tobacco. She reports that she does not drink alcohol or use drugs.    Allergies  Allergies   Allergen Reactions   • Nkda [No Known Drug Allergy]        Medications  None       Physical Exam  Temp:  [36.6 °C (97.9 °F)] 36.6 °C (97.9 °F)  Pulse:  [] 128  Resp:  [16-26] 24  BP: (104-156)/(44-88) 106/44  SpO2:  [91 %-100 %] 92 %    Physical Exam  Vitals signs and nursing note reviewed.   Constitutional:       General: She is not in acute distress.     Appearance: Normal appearance. She is obese. She is not ill-appearing.   HENT:      Head: Normocephalic and atraumatic.      Nose: Nose normal.      Mouth/Throat:      Mouth: Mucous membranes are dry.      Pharynx: Oropharynx is clear.   Eyes:      General: No scleral icterus.        Right eye: No discharge.         Left eye: No discharge.      Extraocular Movements: Extraocular movements intact.      Conjunctiva/sclera: Conjunctivae normal.   Neck:       Musculoskeletal: Normal range of motion and neck supple. No neck rigidity or muscular tenderness.   Cardiovascular:      Rate and Rhythm: Regular rhythm. Tachycardia present.      Pulses: Normal pulses.      Heart sounds: Normal heart sounds. No murmur.   Pulmonary:      Effort: Pulmonary effort is normal. No respiratory distress.      Breath sounds: Normal breath sounds. No wheezing or rales.   Abdominal:      General: Bowel sounds are normal. There is no distension.      Palpations: Abdomen is soft.      Tenderness: There is no tenderness.   Musculoskeletal: Normal range of motion.         General: No swelling.   Skin:     General: Skin is warm and dry.      Capillary Refill: Capillary refill takes less than 2 seconds.   Neurological:      Mental Status: She is alert.      Comments: Patient is easy to arouse but falls asleep, able to follow some minimal commands.    Psychiatric:         Mood and Affect: Mood normal.         Laboratory:  Recent Labs     02/10/20  0555   WBC 15.3*   RBC 4.96   HEMOGLOBIN 14.2   HEMATOCRIT 42.8   MCV 86.3   MCH 28.6   MCHC 33.2*   RDW 39.4   PLATELETCT 288   MPV 11.2     Recent Labs     02/10/20  0555   SODIUM 134*   POTASSIUM 4.3   CHLORIDE 102   CO2 23   GLUCOSE 137*   BUN 13   CREATININE 0.67   CALCIUM 9.8     Recent Labs     02/10/20  0555   ALTSGPT 42   ASTSGOT 37   ALKPHOSPHAT 88   TBILIRUBIN 0.5   GLUCOSE 137*         No results for input(s): NTPROBNP in the last 72 hours.      No results for input(s): TROPONINT in the last 72 hours.    Urinalysis:    No results found     Imaging:  CT-HEAD W/O   Final Result      No CT evidence of acute infarct, hemorrhage or mass.            Assessment/Plan:  I anticipate this patient will require at least two midnights for appropriate medical management, necessitating inpatient admission.    * Seizure (HCC)- (present on admission)  Assessment & Plan  Received ativan and midazolam in the ER still post ictal, I ordered EEG discussed case with  neurology at this time recommended close monitoring and continue keppra as scheduled.   Neuro floor admission, neurochecks per protocol.      Tachycardia- (present on admission)  Assessment & Plan  Probably reactive ekg showed sinus rhythm. Will check Mg level.    Obesity- (present on admission)  Assessment & Plan  Body mass index is 33.09 kg/m².  Needs to lose weight.     Hyperglycemia- (present on admission)  Assessment & Plan  Will check a1c.     Hyponatremia- (present on admission)  Assessment & Plan  Mild likely due to hyperglycemia.     MS (multiple sclerosis) (HCC)- (present on admission)  Assessment & Plan  Patient lost f/u as outpatient as per neuro visit as outpatient patient was to get MRI spine and to start on Tecfidera( dimethyl fumarate) as DMT, I have discussed case with neurology on call DR Capone  Since patient does not have new neuro deficit  Presentation does not meet criteria for MS flareup will continue monitoring for now, she will need f/u as outpatient will check eeg, consider repeating MRI. Consider formal neuro consult if required      VTE prophylaxis: scd's.

## 2020-02-10 NOTE — ED NOTES
Family came out of room saying patient's pain in head was worse. RN at bedside for neuro exam with no change. Awaiting MD lowe

## 2020-02-10 NOTE — ED NOTES
Patient postictal and agitated. Medicated. Repositioned and placed back on cardiac monitor. Keppra infusing.

## 2020-02-10 NOTE — ED NOTES
Med rec complete per pt's family member at bedside. Per family, pt stopped taking keppra approx 1 month ago. KEL

## 2020-02-10 NOTE — ASSESSMENT & PLAN NOTE
Received ativan and midazolam in the ER still post ictal, I ordered EEG discussed case with neurology at this time recommended close monitoring and continue keppra as scheduled.   Neuro floor admission, neurochecks per protocol.     labor/delivery

## 2020-02-10 NOTE — ED NOTES
Medicated per MAR. 1x emesis. Assisted patient with new gown, warm blankets. Patient is resting in bed, awaiting CT.

## 2020-02-10 NOTE — ED NOTES
"Agree with triage assessment, no changes noted. Pt reports \"I've had this headache for two weeks and nothing helps very much.\"  Pt hooked to monitor. Pt denies any further needs at this time. Call light within reach. Bed in low locked position. Comfort measures provided.     "

## 2020-02-10 NOTE — ED NOTES
Patient sleeping. Mother at bedside endorses her pain has improved. Call bell within reach, side rails up, bed in low position. VSS on cardiac monitor.

## 2020-02-11 VITALS
SYSTOLIC BLOOD PRESSURE: 107 MMHG | BODY MASS INDEX: 32.95 KG/M2 | WEIGHT: 205 LBS | TEMPERATURE: 98.4 F | HEART RATE: 83 BPM | HEIGHT: 66 IN | RESPIRATION RATE: 18 BRPM | OXYGEN SATURATION: 100 % | DIASTOLIC BLOOD PRESSURE: 63 MMHG

## 2020-02-11 PROBLEM — E87.1 HYPONATREMIA: Status: RESOLVED | Noted: 2020-02-10 | Resolved: 2020-02-11

## 2020-02-11 PROBLEM — R00.0 TACHYCARDIA: Status: RESOLVED | Noted: 2020-02-10 | Resolved: 2020-02-11

## 2020-02-11 PROBLEM — Z91.148 NONCOMPLIANCE WITH MEDICATIONS: Status: ACTIVE | Noted: 2020-02-11

## 2020-02-11 PROBLEM — R73.9 STRESS HYPERGLYCEMIA: Status: RESOLVED | Noted: 2020-02-10 | Resolved: 2020-02-11

## 2020-02-11 LAB
ALBUMIN SERPL BCP-MCNC: 3.7 G/DL (ref 3.2–4.9)
ALBUMIN/GLOB SERPL: 1.2 G/DL
ALP SERPL-CCNC: 67 U/L (ref 30–99)
ALT SERPL-CCNC: 27 U/L (ref 2–50)
ANION GAP SERPL CALC-SCNC: 8 MMOL/L (ref 0–11.9)
AST SERPL-CCNC: 14 U/L (ref 12–45)
BILIRUB SERPL-MCNC: 0.4 MG/DL (ref 0.1–1.5)
BUN SERPL-MCNC: 15 MG/DL (ref 8–22)
CALCIUM SERPL-MCNC: 8.7 MG/DL (ref 8.5–10.5)
CHLORIDE SERPL-SCNC: 110 MMOL/L (ref 96–112)
CO2 SERPL-SCNC: 22 MMOL/L (ref 20–33)
CREAT SERPL-MCNC: 0.61 MG/DL (ref 0.5–1.4)
ERYTHROCYTE [DISTWIDTH] IN BLOOD BY AUTOMATED COUNT: 39.5 FL (ref 35.9–50)
GLOBULIN SER CALC-MCNC: 3 G/DL (ref 1.9–3.5)
GLUCOSE SERPL-MCNC: 114 MG/DL (ref 65–99)
HCT VFR BLD AUTO: 37.3 % (ref 37–47)
HGB BLD-MCNC: 12.5 G/DL (ref 12–16)
MCH RBC QN AUTO: 28.8 PG (ref 27–33)
MCHC RBC AUTO-ENTMCNC: 33.5 G/DL (ref 33.6–35)
MCV RBC AUTO: 85.9 FL (ref 81.4–97.8)
PLATELET # BLD AUTO: 276 K/UL (ref 164–446)
PMV BLD AUTO: 10.9 FL (ref 9–12.9)
POTASSIUM SERPL-SCNC: 3.8 MMOL/L (ref 3.6–5.5)
PROT SERPL-MCNC: 6.7 G/DL (ref 6–8.2)
RBC # BLD AUTO: 4.34 M/UL (ref 4.2–5.4)
SODIUM SERPL-SCNC: 140 MMOL/L (ref 135–145)
WBC # BLD AUTO: 19 K/UL (ref 4.8–10.8)

## 2020-02-11 PROCEDURE — 85027 COMPLETE CBC AUTOMATED: CPT

## 2020-02-11 PROCEDURE — A9270 NON-COVERED ITEM OR SERVICE: HCPCS | Performed by: HOSPITALIST

## 2020-02-11 PROCEDURE — 80053 COMPREHEN METABOLIC PANEL: CPT

## 2020-02-11 PROCEDURE — 99239 HOSP IP/OBS DSCHRG MGMT >30: CPT | Performed by: INTERNAL MEDICINE

## 2020-02-11 PROCEDURE — 700105 HCHG RX REV CODE 258: Performed by: HOSPITALIST

## 2020-02-11 PROCEDURE — 700102 HCHG RX REV CODE 250 W/ 637 OVERRIDE(OP): Performed by: HOSPITALIST

## 2020-02-11 PROCEDURE — 700111 HCHG RX REV CODE 636 W/ 250 OVERRIDE (IP): Performed by: HOSPITALIST

## 2020-02-11 PROCEDURE — 36415 COLL VENOUS BLD VENIPUNCTURE: CPT

## 2020-02-11 RX ORDER — ONDANSETRON 4 MG/1
4 TABLET, ORALLY DISINTEGRATING ORAL EVERY 4 HOURS PRN
Qty: 15 TAB | Refills: 0 | Status: SHIPPED | OUTPATIENT
Start: 2020-02-11 | End: 2020-10-13

## 2020-02-11 RX ORDER — BUTALBITAL, ACETAMINOPHEN AND CAFFEINE 50; 325; 40 MG/1; MG/1; MG/1
1 TABLET ORAL EVERY 4 HOURS PRN
Qty: 10 TAB | Refills: 0 | Status: SHIPPED | OUTPATIENT
Start: 2020-02-11 | End: 2020-02-16

## 2020-02-11 RX ORDER — LEVETIRACETAM 500 MG/1
500 TABLET ORAL 2 TIMES DAILY
Qty: 60 TAB | Refills: 1 | Status: SHIPPED | OUTPATIENT
Start: 2020-02-11 | End: 2020-02-12 | Stop reason: SDUPTHER

## 2020-02-11 RX ORDER — METHYLPREDNISOLONE 4 MG/1
TABLET ORAL
Qty: 1 PACKAGE | Refills: 0 | Status: SHIPPED | OUTPATIENT
Start: 2020-02-11 | End: 2020-10-13

## 2020-02-11 RX ORDER — LEVETIRACETAM 500 MG/1
500 TABLET ORAL 2 TIMES DAILY
Status: DISCONTINUED | OUTPATIENT
Start: 2020-02-11 | End: 2020-02-11 | Stop reason: HOSPADM

## 2020-02-11 RX ADMIN — SENNOSIDES AND DOCUSATE SODIUM 2 TABLET: 8.6; 5 TABLET ORAL at 06:18

## 2020-02-11 RX ADMIN — MELATONIN 1000 UNITS: at 06:18

## 2020-02-11 RX ADMIN — SODIUM CHLORIDE 500 MG: 9 INJECTION, SOLUTION INTRAVENOUS at 06:18

## 2020-02-11 RX ADMIN — SODIUM CHLORIDE: 9 INJECTION, SOLUTION INTRAVENOUS at 04:28

## 2020-02-11 ASSESSMENT — COGNITIVE AND FUNCTIONAL STATUS - GENERAL
SUGGESTED CMS G CODE MODIFIER MOBILITY: CH
SUGGESTED CMS G CODE MODIFIER DAILY ACTIVITY: CH
DAILY ACTIVITIY SCORE: 24
MOBILITY SCORE: 24

## 2020-02-11 NOTE — PROGRESS NOTES
2 RN skin check completed with JOSE G Lopes. Skin intact, no s/s of breakdown noted. Pt with small laceration to right side of tongue from seizure.

## 2020-02-11 NOTE — PROGRESS NOTES
Monitor summary: SR 68-85 , [place] 0.14, QRS 0.08, QT 0.34, with no ectopy per strip from monitor room.

## 2020-02-11 NOTE — PROGRESS NOTES
Assumed care of patient at 1900. Sleeping at this time. Family at bedside, no needs.     Upon rounding, pt A/O x4, no pain, no neuro changes. Needs addressed, reinforced call light, fall status, and plan of care.

## 2020-02-11 NOTE — DISCHARGE PLANNING
Care Transition Team Assessment    The information provided by this assessment was provided by pt and chart review. Pt confirmed the information on facesheet was accurate. Pt lives in a one story house with her son. Prior to admit pt was working fulltime. Pt has a working permit and is not a citizen which is the reason why she is connected to [facility]. Pt states to be independent with ADL's/IADL's. Pt uses the [facility] pharmacy in [place].     Pt discussed during rounds. Bedside RNEvon informed LSW that pt may need financial assistance for Keppra. During rounds Dr. Recinos informed LSW that pt has medicine available and stated that she has funds to pay for medication.     Information Source  Orientation : Oriented x 4  Information Given By: Patient  Who is responsible for making decisions for patient? : Patient    Elopement Risk  Legal Hold: No  Ambulatory or Self Mobile in Wheelchair: Yes  Disoriented: No  Psychiatric Symptoms: None  History of Wandering: No  Elopement this Admit: No  Vocalizing Wanting to Leave: No  Displays Behaviors, Body Language Wanting to Leave: No-Not at Risk for Elopement    Interdisciplinary Discharge Planning  Patient or legal guardian wants to designate a caregiver (see row info): No    Discharge Preparedness  What is your plan after discharge?: Home with help  What are your discharge supports?: Child, Parent, Sibling  Prior Functional Level: Independent with Activities of Daily Living, Independent with Medication Management  Difficulity with ADLs: None  Difficulity with IADLs: None    Functional Assesment  Prior Functional Level: Independent with Activities of Daily Living, Independent with Medication Management    Finances  Financial Barriers to Discharge: No  Prescription Coverage: Yes    Vision / Hearing Impairment  Vision Impairment : No  Hearing Impairment : No    Domestic Abuse  Have you ever been the victim of abuse or violence?: No  Physical Abuse or Sexual Abuse:  No  Verbal Abuse or Emotional Abuse: No  Possible Abuse Reported to:: Not Applicable    Discharge Risks or Barriers  Discharge risks or barriers?: No    Anticipated Discharge Information  Anticipated discharge disposition: Home

## 2020-02-11 NOTE — PROGRESS NOTES
Monitor summary: SR/ST , [place] 0.16, QRS 0.10, QT 0.40, with rare PVCs and PACs per strip from monitor room.

## 2020-02-11 NOTE — CARE PLAN
Problem: Communication  Goal: The ability to communicate needs accurately and effectively will improve  Outcome: PROGRESSING AS EXPECTED     Problem: Safety  Goal: Will remain free from injury  Outcome: PROGRESSING AS EXPECTED  Goal: Will remain free from falls  Outcome: PROGRESSING AS EXPECTED     Problem: Knowledge Deficit  Goal: Knowledge of the prescribed therapeutic regimen will improve  Outcome: PROGRESSING AS EXPECTED     Problem: Fluid Volume:  Goal: Will maintain balanced intake and output  Outcome: PROGRESSING AS EXPECTED

## 2020-02-11 NOTE — PROGRESS NOTES
Pt arrived to unit around 1630. Ambulated from gurney to bathroom then to bed. Pt AAOx4. Denies headache at this time. Tele monitor in place. Fall, aspiration and seizure precautions in place. Family at bedside, updated on POC. Received order from Dr. Leger to perform bedside swallow eval, pt passed and place on regular diet. Will continue hourly rounding.

## 2020-02-11 NOTE — DIETARY
Nutrition Services: Day 1 of admit. Rosanna Ramires is a 24 y.o. female with admitting DX of Headache, Seizure.  Consult received for unsure weight loss.     Assessment:  Ht: 167.6 cm, Wt 2/10: 93 kg (205 lb) via stand up scale, BMI: 33.09 - obesity  Diet/Intake: regular - Per chart pt PO % x 1 meal     Evaluation:   1. PMHx: MS  2. Weight hx per chart review: 96.3 kg (212 lb 4.9 oz) via stand up scale on 7/20/19, 92.8 kg (204 lb 9.6 oz) on 8/14/19  3. Weight appears stable.   4. Meds: pericolace (given today)  5. Fluids: NS infusion @ 75 ml/hr  6. Last BM PTA    Malnutrition Risk: No criteria met at this time.     Recommendations/Plan:  1. Encourage intake of meals.  2. Document intake of all meals as % taken in ADL's to provide interdisciplinary communication across all shifts.   3. Monitor weight.  4. Nutrition rep will continue to see patient for ongoing meal and snack preferences.  5. Obtain supplement order per RD as needed.

## 2020-02-11 NOTE — DISCHARGE SUMMARY
Discharge Summary    CHIEF COMPLAINT ON ADMISSION  Chief Complaint   Patient presents with   • Headache     for the last two weeks pt has had intermittent headaches, yesterday the headache came back on her L side of her head and photophobia that has not gone away. Pt denies n/v, numbness or tingling at this time. Pt took advil around 1900 yesterday with slight relief.        Reason for Admission  Headache, Weakness     Admission Date  2/10/2020    CODE STATUS  Full Code    HPI & HOSPITAL COURSE  This is a 24 y.o. female with seizure disorder, multiple sclerosis, admitted 2/10/2020 with seizure.  She initially presented to the ED with headache where work-up was negative with negative head CT.  She was given Reglan, Benadryl, and Decadron with subsequent improvement in her headache.  She did develop seizure when she was about to be discharged from the ED.   apparently, she has not been taking her Keppra, and only resume taking them since February 1. Patient is not following up with neurology as outpatient due to having no insurance. She was recently seen by neurology as inpatient, when she was diagnosed with MS, and she was started on medications Tecfidera (dimethyl fumarate) as DMT, but unfortunately did not continue following since she could not afford plan of care.       She was treated with Ativan/Versed, along with IV Keppra. Neurology was contacted, and recommended continuing Keppra. EEG was ordered which showed no status epilepticus, or any focal or generalized epileptiform activity, or any regional slowing.  Work-up showed WBC of 19,000, which was attributed to the steroid that was initially given to her. Electrolytes and renal function are normal.     With resumption of her antiepileptic medication, she remained stable, and no further seizures were noted.  She remained hemodynamically stable and afebrile.  She returned back to her baseline mentation.    I have personally seen and examined the patient on the  day of discharge. With her clinical improvement, she was deemed ready to discharge from the hospital as she did not have any further hospitalization needs. Patient felt comfortable going home. The discharge plan was discussed with the patient, with which she was agreeable to.       Therefore, she is discharged in good and stable condition to home with close outpatient follow-up.    The patient recovered much more quickly than anticipated on admission.    Discharge Date  2/11/2020      FOLLOW UP ITEMS POST DISCHARGE  -She will continue to take her Keppra 500 mg twice daily.  I counseled her extensively regarding consistently taking her medications.   -She is counseled not to drive or operate heavy machinery's until she is cleared by neurology/PCP.  -Follow-up with neurology and PCP.  She will try to obtain insurance for continued neurology follow-up.  -counseled to seek immediate medical attention, or return to the ED for recurrent or worsening symptoms.      DISCHARGE DIAGNOSES  Principal Problem:    Seizure (HCC) POA: Yes  Active Problems:    Leukocytosis, stress- and steroid-related POA: Yes    Noncompliance with medications POA: Yes    MS (multiple sclerosis) (HCC) POA: Yes    Obesity POA: Yes  Resolved Problems:    Hyponatremia POA: Yes    Stress hyperglycemia POA: Yes    Tachycardia POA: Yes      FOLLOW UP  No future appointments.  No follow-up provider specified.    MEDICATIONS ON DISCHARGE     Medication List      START taking these medications      Instructions   acetaminophen/caffeine/butalbital 325-40-50 mg -40 MG Tabs  Commonly known as:  FIORICET   Take 1 Tab by mouth every four hours as needed for Headache for up to 5 days.  Dose:  1 Tab     levETIRAcetam 500 MG Tabs  Commonly known as:  KEPPRA   Take 1 Tab by mouth 2 Times a Day.  Dose:  500 mg     methylPREDNISolone 4 MG Tbpk  Commonly known as:  MEDROL DOSEPAK   Doctor's comments:  Use as directed  Use as directed     ondansetron 4 MG  Tbdp  Commonly known as:  Zofran ODT   Take 1 Tab by mouth every four hours as needed.  Dose:  4 mg            Allergies  Allergies   Allergen Reactions   • Nkda [No Known Drug Allergy]        DIET  Orders Placed This Encounter   Procedures   • Diet Order Regular     Standing Status:   Standing     Number of Occurrences:   1     Order Specific Question:   Diet:     Answer:   Regular [1]       ACTIVITY  As tolerated.  Weight bearing as tolerated    CONSULTATIONS  none    PROCEDURES  none    LABORATORY  Lab Results   Component Value Date    SODIUM 140 02/11/2020    POTASSIUM 3.8 02/11/2020    CHLORIDE 110 02/11/2020    CO2 22 02/11/2020    GLUCOSE 114 (H) 02/11/2020    BUN 15 02/11/2020    CREATININE 0.61 02/11/2020        Lab Results   Component Value Date    WBC 19.0 (H) 02/11/2020    HEMOGLOBIN 12.5 02/11/2020    HEMATOCRIT 37.3 02/11/2020    PLATELETCT 276 02/11/2020        Total time of the discharge process = 39 minutes.

## 2020-02-12 ENCOUNTER — OFFICE VISIT (OUTPATIENT)
Dept: MEDICAL GROUP | Facility: PHYSICIAN GROUP | Age: 25
End: 2020-02-12
Payer: COMMERCIAL

## 2020-02-12 VITALS
TEMPERATURE: 97.8 F | WEIGHT: 208 LBS | BODY MASS INDEX: 38.28 KG/M2 | DIASTOLIC BLOOD PRESSURE: 62 MMHG | HEART RATE: 65 BPM | RESPIRATION RATE: 16 BRPM | SYSTOLIC BLOOD PRESSURE: 120 MMHG | OXYGEN SATURATION: 96 % | HEIGHT: 62 IN

## 2020-02-12 DIAGNOSIS — Z13.29 SCREENING FOR THYROID DISORDER: ICD-10-CM

## 2020-02-12 DIAGNOSIS — R56.9 SEIZURE (HCC): ICD-10-CM

## 2020-02-12 DIAGNOSIS — E04.9 ENLARGED THYROID: ICD-10-CM

## 2020-02-12 DIAGNOSIS — R53.83 FATIGUE, UNSPECIFIED TYPE: ICD-10-CM

## 2020-02-12 DIAGNOSIS — R73.03 PREDIABETES: ICD-10-CM

## 2020-02-12 DIAGNOSIS — G44.89 OTHER HEADACHE SYNDROME: ICD-10-CM

## 2020-02-12 DIAGNOSIS — G35 MS (MULTIPLE SCLEROSIS) (HCC): ICD-10-CM

## 2020-02-12 PROBLEM — R79.89 ELEVATED LFTS: Status: RESOLVED | Noted: 2019-07-18 | Resolved: 2020-02-12

## 2020-02-12 PROBLEM — E87.6 HYPOKALEMIA: Status: RESOLVED | Noted: 2019-07-16 | Resolved: 2020-02-12

## 2020-02-12 PROCEDURE — 99203 OFFICE O/P NEW LOW 30 MIN: CPT | Performed by: NURSE PRACTITIONER

## 2020-02-12 RX ORDER — LEVETIRACETAM 500 MG/1
500 TABLET ORAL 2 TIMES DAILY
Qty: 60 TAB | Refills: 1 | Status: SHIPPED | OUTPATIENT
Start: 2020-02-12 | End: 2020-10-27 | Stop reason: SDUPTHER

## 2020-02-12 ASSESSMENT — PATIENT HEALTH QUESTIONNAIRE - PHQ9: CLINICAL INTERPRETATION OF PHQ2 SCORE: 0

## 2020-02-12 NOTE — ASSESSMENT & PLAN NOTE
Patient is 24-year-old female here to establish care with me today.  Patient has history of seizure disorder.  Had seizures as a child and took Keppra for some time.  Discontinued Keppra and did not have seizures for over 10 years.  Was hospitalized 8 months ago for seizure and was started on Keppra.  Patient reports she only took it for a month.  Did not have any seizures until 2 days ago.  Was seen in ER 2 days ago for severe migraine and seizure.  Was restarted on Keppra.  Does report since last seizure 2 days ago, vision in right eye is still somewhat blurred when she looks at herself, but when she looks at someone else there is no blurred vision.  Did consult with neurology at time of hospitalization 8 months ago.  Patient must follow-up due to expense.  She now has different insurance.  Will refer back to neurology.

## 2020-02-12 NOTE — ASSESSMENT & PLAN NOTE
Patient is 24-year-old female here to establish care with me today.  Patient diagnosed with multiple sclerosis during hospitalization about 8 months ago.  Consulted with neurology, Dr. Paz, at time of diagnosis.  Was to have follow-up lab work and imaging done.  She was unable to do so secondary to expense.  Denies new neuro deficits.  Most recently seen in ER for seizure.  Please see additional notes.  Patient now has health insurance.  Will refer back to neurology.

## 2020-02-12 NOTE — ASSESSMENT & PLAN NOTE
Patient was seen in the ER 2 days ago for a left-sided headache.  Reports headache has resolved.  Has not had to take Fioricet.

## 2020-02-12 NOTE — ASSESSMENT & PLAN NOTE
Patient is 24-year-old female here to establish care with me today.  A1c during ER visit 2 days ago showed mildly elevated.  Component      Latest Ref Rng & Units 2/10/2020           5:55 AM   Glycohemoglobin      0.0 - 5.6 % 5.7 (H)

## 2020-02-12 NOTE — PROGRESS NOTES
CC: Establish care, ER follow-up    HISTORY OF THE PRESENT ILLNESS: Patient is a 24 y.o. female. This pleasant patient is here today to establish care and for evaluation and management of the following health problems.  She reports she has not had routine primary care provider in the past.  She does not exercise daily, does not smoke.  Family health history is negative for colon cancer, breast cancer, prostate cancer, diabetes, heart disease.  Patient works full-time at [employer].  Has 7-year-old son.  She is single.  Not currently sexually active.      Health Maintenance: Due for Pap  Reviewed rationale for second HPV vaccination.  Patient will consider and let me know next appointment.  Patient declined flu vaccine, though we reviewed benefits of flu vaccine.  Encouraged him to wash his hands frequently and stay out of environments with people who are ill.        MS (multiple sclerosis) ([facility])  Patient is 24-year-old female here to establish care with me today.  Patient diagnosed with multiple sclerosis during hospitalization about 8 months ago.  Consulted with neurology, Dr. Paz, at time of diagnosis.  Was to have follow-up lab work and imaging done.  She was unable to do so secondary to expense.  Denies new neuro deficits.  Most recently seen in ER for seizure.  Please see additional notes.  Patient now has health insurance.  Will refer back to neurology.    Seizure ([facility])  Patient is 24-year-old female here to establish care with me today.  Patient has history of seizure disorder.  Had seizures as a child and took Keppra for some time.  Discontinued Keppra and did not have seizures for over 10 years.  Was hospitalized 8 months ago for seizure and was started on Keppra.  Patient reports she only took it for a month.  Did not have any seizures until 2 days ago.  Was seen in ER 2 days ago for severe migraine and seizure.  Was restarted on Keppra.  Does report since last seizure 2 days ago, vision in right  eye is still somewhat blurred when she looks at herself, but when she looks at someone else there is no blurred vision.  Did consult with neurology at time of hospitalization 8 months ago.  Patient must follow-up due to expense.  She now has different insurance.  Will refer back to neurology.      Prediabetes  Patient is 24-year-old female here to establish care with me today.  A1c during ER visit 2 days ago showed mildly elevated.  Component      Latest Ref Rng & Units 2/10/2020           5:55 AM   Glycohemoglobin      0.0 - 5.6 % 5.7 (H)         Headache  Patient was seen in the ER 2 days ago for a left-sided headache.  Reports headache has resolved.  Has not had to take Fioricet.      Allergies: Nkda [no known drug allergy]    Current Outpatient Medications Ordered in Epic   Medication Sig Dispense Refill   • levETIRAcetam (KEPPRA) 500 MG Tab Take 1 Tab by mouth 2 Times a Day. 60 Tab 1   • acetaminophen/caffeine/butalbital 325-40-50 mg (FIORICET) -40 MG Tab Take 1 Tab by mouth every four hours as needed for Headache for up to 5 days. 10 Tab 0   • methylPREDNISolone (MEDROL DOSEPAK) 4 MG Tablet Therapy Pack Use as directed 1 Package 0   • ondansetron (ZOFRAN ODT) 4 MG TABLET DISPERSIBLE Take 1 Tab by mouth every four hours as needed for Nausea (headache). 15 Tab 0     No current Epic-ordered facility-administered medications on file.        Past Medical History:   Diagnosis Date   • Head ache    • Migraine    • Seizure (HCC)     last  one was 2 years ago, not on medication       History reviewed. No pertinent surgical history.    Social History     Tobacco Use   • Smoking status: Never Smoker   • Smokeless tobacco: Never Used   Substance Use Topics   • Alcohol use: No   • Drug use: No       History reviewed. No pertinent family history.    ROS:     - Constitutional: Negative for fever, chills, unexpected weight change, and generalized weakness.  Positive fatigue    - HEENT: Negative for ear pain, rhinorrhea,  "sinus congestion, and sore throat.   Positive headaches, vision changes, hearing changes    - Respiratory: Negative for cough, dyspnea, and wheezing.      - Cardiovascular: Negative for chest pain, palpitations, orthopnea, and bilateral lower extremity edema.     - Gastrointestinal: Negative for heartburn, nausea, vomiting, abdominal pain, hematochezia, melena, diarrhea, constipation.     - Genitourinary: Negative for dysuria, polyuria, hematuria, pyuria, urinary urgency, and urinary incontinence.    - Musculoskeletal: Negative for myalgias, back pain, and joint pain.     - Skin: Negative for rash, itching, cyanotic skin color change.     - Neurological: Negative for tingling, tremors, focal sensory deficit, focal weakness and headaches.  Positive dizziness    - Endo/Heme/Allergies: Does not bruise/bleed easily.     - Psychiatric/Behavioral: Negative for depression, suicidal/homicidal ideation and memory loss.           Exam: /62 (BP Location: Right arm, Patient Position: Sitting, BP Cuff Size: Adult)   Pulse 65   Temp 36.6 °C (97.8 °F)   Resp 16   Ht 1.575 m (5' 2\")   Wt 94.3 kg (208 lb)   SpO2 96%  Body mass index is 38.04 kg/m².    General: Alert, pleasant, well nourished, well developed female in NAD  HEENT: Normocephalic. Eyes conjunctiva clear lids without ptosis, pupils equal and reactive to light, ears normal shape and contour, canals are clear bilaterally, tympanic membranes are pearly gray with good light reflex, nasal mucosa without erythema and drainage, oropharynx is without erythema, edema or exudates.   Neck: Supple without bruit. Thyroid is mildly enlarged.  Pulmonary: Clear to ausculation.  Normal effort. No rales, ronchi, or wheezing.  Cardiovascular: Normal rate and rhythm without murmur. Carotid and radial pulses are intact and equal bilaterally.  No lower extremity edema.  Abdomen: Soft, nontender, nondistended. Normal bowel sounds. Liver and spleen are not palpable  Neurologic: " Grossly nonfocal  Lymph: No cervical or supraclavicular lymph nodes are palpable  Skin: Warm and dry.    Musculoskeletal: Normal gait.   Psych: Normal mood and affect. Alert and oriented. Judgment and insight is normal.    Please note that this dictation was created using voice recognition software. I have made every reasonable attempt to correct obvious errors, but I expect that there are errors of grammar and possibly content that I did not discover before finalizing the note.      Assessment/Plan  1. MS (multiple sclerosis) ([facility])  Relatively new diagnosis.  Has not had any flares that she knows of.  Patient needs to reestablish with neurology.  She is in a  - REFERRAL TO NEUROLOGY    2. Seizure ([facility])  Patient has distant history of seizures as a child, then started again about 8 months ago.  Has restarted Keppra.  Needs to establish with allergy.  Patient is in agreement.  - REFERRAL TO NEUROLOGY    3. Enlarged thyroid  Thyroid mildly enlarged.  Patient having some fatigue.  Will get thyroid panel.  - TSH; Future  - FREE THYROXINE; Future  - THYROID PEROXIDASE  (TPO) AB; Future    4. Fatigue, unspecified type    - TSH; Future  - FREE THYROXINE; Future  - THYROID PEROXIDASE  (TPO) AB; Future  - CBC WITHOUT DIFFERENTIAL; Future    5. Screening for thyroid disorder  We will review lab results with patient next appointment.  - TSH; Future  - FREE THYROXINE; Future  - THYROID PEROXIDASE  (TPO) AB; Future    6. Prediabetes  Advised patient on lifestyle measures including routine aerobic exercise as tolerated, weight loss, low carbohydrate diet.    Patient will return to clinic in 3 months for Pap and lab review or sooner if needed.

## 2020-09-01 NOTE — DISCHARGE INSTRUCTIONS
Discharge Instructions per Delvis Recinos M.D.    -Consistently take your Keppra/seizure medication.  Follow-up with neurology as able.  -Follow-up with PCP.  -seek immediate medical attention, or return to the ED for recurrent or worsening symptoms.      DIET: regular    ACTIVITY: as tolerated. No driving until cleared by neurology/PCP.    DIAGNOSIS: seizure    Discharge Instructions    Discharged to home by car with relative. Discharged via wheelchair, hospital escort: Refused.  Special equipment needed: Not Applicable    Be sure to schedule a follow-up appointment with your primary care doctor or any specialists as instructed.     Discharge Plan:   Influenza Vaccine Indication: Not indicated: Previously immunized this influenza season and > 8 years of age    I understand that a diet low in cholesterol, fat, and sodium is recommended for good health. Unless I have been given specific instructions below for another diet, I accept this instruction as my diet prescription.   Other diet: Regular    Special Instructions: None    · Is patient discharged on Warfarin / Coumadin?   No     Depression / Suicide Risk    As you are discharged from this [facility] facility, it is important to learn how to keep safe from harming yourself.    Recognize the warning signs:  · Abrupt changes in personality, positive or negative- including increase in energy   · Giving away possessions  · Change in eating patterns- significant weight changes-  positive or negative  · Change in sleeping patterns- unable to sleep or sleeping all the time   · Unwillingness or inability to communicate  · Depression  · Unusual sadness, discouragement and loneliness  · Talk of wanting to die  · Neglect of personal appearance   · Rebelliousness- reckless behavior  · Withdrawal from people/activities they love  · Confusion- inability to concentrate     If you or a loved one observes any of these behaviors or has concerns about self-harm, here's what  you can do:  · Talk about it- your feelings and reasons for harming yourself  · Remove any means that you might use to hurt yourself (examples: pills, rope, extension cords, firearm)  · Get professional help from the community (Mental Health, Substance Abuse, psychological counseling)  · Do not be alone:Call your Safe Contact- someone whom you trust who will be there for you.  · Call your local CRISIS HOTLINE 912-4957 or 689-492-7249  · Call your local Children's Mobile Crisis Response Team [place] (169) 616-2095 or www.Propertybase  · Call the toll free National Suicide Prevention Hotlines   · National Suicide Prevention Lifeline 322-966-NQNR (2771)  · National Hope Line Network 800-SUICIDE (056-9209)               agree with above  briefly, 33yo HD#2/POD#7 s/p LTCS a/w elevated BP, fever 2/2 endometritis vs retanied POC vs mastitis POD#6 s/p rLTCS (2600/3400/175) and partial cerclage removal c/b focal accreta and uterine atony. Breast milk culture grew gram variable coccobacilli. Pain improved after antibiotics. - Increase ambulation.    note in progress agree with above  briefly, 31yo HD#2/POD#7 s/p LTCS a/w elevated BP, fever 2/2 endometritis vs retanied POC vs mastitis POD#6 s/p rLTCS (2600/3400/175) and partial cerclage removal c/b focal accreta and uterine atony. Breast milk culture grew gram variable coccobacilli.   AF since yesterday  wbc decreasing  Pain improved after antibiotics.   broaden coverage with vanc and flagyl. cont aztreonam.   follow up cultures (breast, urine, blood)  daily cbc/trend wbc    note in progress agree with above  briefly, 31yo HD#2/POD#7 s/p LTCS a/w elevated BP, fever 2/2 infection of unclear origin, likely endometritis (vs retained POC vs mastitis), POD#6 s/p rLTCS c/b focal accreta and uterine atony.  sono favors no evidence of retained POC (awaiting final). CT possible endometritis.  dec bleeding and dec pain since starting antibiotics  AF since yesterday evening (Tmax 38.9 at 20:29 on 8/31)  wbc decreasing                        8.3    7.76  )-----------( 325      ( 01 Sep 2020 12:44 )             26.3     monitor pain/bleeding. both improved since start of abx  broaden coverage with vanc and flagyl. cont aztreonam. appreciate ID recs  follow up cultures (breast, urine, blood)  daily cbc/trend wbc  plan of care reviewed. questions answered    maggi redding MD

## 2020-10-13 ENCOUNTER — OFFICE VISIT (OUTPATIENT)
Dept: URGENT CARE | Facility: PHYSICIAN GROUP | Age: 25
End: 2020-10-13

## 2020-10-13 ENCOUNTER — NURSE TRIAGE (OUTPATIENT)
Dept: HEALTH INFORMATION MANAGEMENT | Facility: OTHER | Age: 25
End: 2020-10-13

## 2020-10-13 VITALS
RESPIRATION RATE: 16 BRPM | HEART RATE: 76 BPM | WEIGHT: 204 LBS | SYSTOLIC BLOOD PRESSURE: 118 MMHG | BODY MASS INDEX: 37.54 KG/M2 | TEMPERATURE: 97.4 F | OXYGEN SATURATION: 98 % | DIASTOLIC BLOOD PRESSURE: 70 MMHG | HEIGHT: 62 IN

## 2020-10-13 DIAGNOSIS — H53.9 VISUAL CHANGES: ICD-10-CM

## 2020-10-13 DIAGNOSIS — Z87.898 HISTORY OF SEIZURES: ICD-10-CM

## 2020-10-13 DIAGNOSIS — R51.9 INTRACTABLE HEADACHE, UNSPECIFIED CHRONICITY PATTERN, UNSPECIFIED HEADACHE TYPE: ICD-10-CM

## 2020-10-13 PROCEDURE — 99213 OFFICE O/P EST LOW 20 MIN: CPT | Performed by: FAMILY MEDICINE

## 2020-10-13 RX ORDER — BUTALBITAL, ACETAMINOPHEN AND CAFFEINE 50; 325; 40 MG/1; MG/1; MG/1
1 TABLET ORAL EVERY 4 HOURS PRN
COMMUNITY
End: 2021-02-02

## 2020-10-13 ASSESSMENT — FIBROSIS 4 INDEX: FIB4 SCORE: 0.26

## 2020-10-13 NOTE — PROGRESS NOTES
"Subjective:      Rosanna Ramires is a 25 y.o. female who presents with Migraine            This is a new problem.  25-year-old with history of MS and seizures presenting for evaluation of headache for the past 3 days.  She has been taking over-the-counter medication without any improvement.  She states that if headache is not treated she will have seizures.  She is on Keppra which was started by a local emergency department.  She was referred to neurology last year and saw them once but never followed up later because she does not have any insurance.  She does have some intermittent visual changes on the right side where she sees spots.  Review of systems otherwise negative.      Review of Systems   All other systems reviewed and are negative.         Objective:     /70 (BP Location: Right arm, Patient Position: Sitting, BP Cuff Size: Adult)   Pulse 76   Temp 36.3 °C (97.4 °F) (Temporal)   Resp 16   Ht 1.575 m (5' 2\")   Wt 92.5 kg (204 lb)   SpO2 98%   BMI 37.31 kg/m²      Physical Exam  Constitutional:       General: She is not in acute distress.     Appearance: She is not ill-appearing, toxic-appearing or diaphoretic.   HENT:      Head: Normocephalic and atraumatic.      Right Ear: External ear normal.      Left Ear: External ear normal.      Mouth/Throat:      Mouth: Mucous membranes are moist.      Pharynx: Oropharynx is clear. No oropharyngeal exudate or posterior oropharyngeal erythema.   Eyes:      Extraocular Movements: Extraocular movements intact.      Conjunctiva/sclera: Conjunctivae normal.      Pupils: Pupils are equal, round, and reactive to light.   Cardiovascular:      Rate and Rhythm: Normal rate and regular rhythm.      Heart sounds: No murmur. No friction rub. No gallop.    Pulmonary:      Effort: Pulmonary effort is normal. No respiratory distress.      Breath sounds: No stridor. No wheezing, rhonchi or rales.   Skin:     General: Skin is warm.      Coloration: Skin is not " jaundiced or pale.      Findings: No erythema or rash.   Neurological:      Mental Status: She is alert and oriented to person, place, and time.      Cranial Nerves: Cranial nerves are intact.      Sensory: Sensation is intact.      Motor: Motor function is intact.      Deep Tendon Reflexes: Reflexes normal.   Psychiatric:         Mood and Affect: Mood normal.                 Assessment/Plan:   ASSESSMENT:PLAN:  1. Intractable headache, unspecified chronicity pattern, unspecified headache type    2. Visual changes    3. History of seizures    And on Keppra, which level is not known.  She has not seen primary care or followed up with neurology.  Commend further evaluation in the ED setting.  Patient was advised to go to local ED for further evaluation

## 2020-10-13 NOTE — TELEPHONE ENCOUNTER
Regarding: CLEARANCE FOR OFFICE VISIT   ----- Message from Lakia Schmitt sent at 10/13/2020 10:28 AM PDT -----  PT STATED SHE HAS A CRISTIANO

## 2020-10-13 NOTE — TELEPHONE ENCOUNTER
"2.  In the last two weeks, has the patient had any new or worsening symptoms (not explained by alternative diagnosis)? No.      3.  Does patient have any comoribidities? Migraine & seizures    4.  Has the patient traveled in the last 14 days OR had any known contact with someone who is suspected or confirmed to have COVID-19?  No.      5. Disposition: Advised to go to     Note routed to [facility] Provider: FYI only. Took pill for migraine that she was given in Feb, took on 10/12 & it did help.      Reason for Disposition  • SEVERE headache (e.g., excruciating) and has had severe headaches before    Additional Information  • Negative: Difficult to awaken or acting confused (e.g., disoriented, slurred speech)  • Negative: Weakness of the face, arm or leg on one side of the body and new onset  • Negative: Numbness of the face, arm or leg on one side of the body and new onset  • Negative: Loss of speech or garbled speech and new onset  • Negative: Passed out (i.e., fainted, collapsed and was not responding)  • Negative: Sounds like a life-threatening emergency to the triager  • Negative: Followed a head injury within last 3 days  • Negative: Traumatic Brain Injury (TBI) is suspected  • Negative: Sinus pain of forehead and yellow or green nasal discharge  • Negative: Pregnant  • Negative: Unable to walk without falling  • Negative: Stiff neck (can't touch chin to chest)  • Negative: Possibility of carbon monoxide exposure    Answer Assessment - Initial Assessment Questions  1. LOCATION: \"Where does it hurt?\"       On left side a little bit above &  nex to eyebrow  2. ONSET: \"When did the headache start?\" (Minutes, hours or days)       10/8/20 @ 0600, throughout day still has it but not as bad  3. PATTERN: \"Does the pain come and go, or has it been constant since it started?\"      Comes & go after the first 20 minutes  4. SEVERITY: \"How bad is the pain?\" and \"What does it keep you from doing?\"  (e.g., Scale 1-10; mild, " "moderate, or severe)    - MILD (1-3): doesn't interfere with normal activities     - MODERATE (4-7): interferes with normal activities or awakens from sleep     - SEVERE (8-10): excruciating pain, unable to do any normal activities         8, just took migraine med she bought @ [facility], 10 before she took med  5. RECURRENT SYMPTOM: \"Have you ever had headaches before?\" If so, ask: \"When was the last time?\" and \"What happened that time?\"       Yes, Feb 2020 & had seizure after one week of migraine, went to ED, taking keppra for seizures,   6. CAUSE: \"What do you think is causing the headache?\"      Not sure, thought she had HA flu, had a little fever before Thursday   7. MIGRAINE: \"Have you been diagnosed with migraine headaches?\" If so, ask: \"Is this headache similar?\"       Yes, pain is same, light & pressure does not bother her this time, is able to sleep but could not sleep in Feb.    8. HEAD INJURY: \"Has there been any recent injury to the head?\"       no  9. OTHER SYMPTOMS: \"Do you have any other symptoms?\" (fever, stiff neck, eye pain, sore throat, cold symptoms)      no  10. PREGNANCY: \"Is there any chance you are pregnant?\" \"When was your last menstrual period?\"        No, 9/17/20.    Protocols used: HEADACHE-A-OH    "

## 2020-10-20 ENCOUNTER — APPOINTMENT (OUTPATIENT)
Dept: RADIOLOGY | Facility: MEDICAL CENTER | Age: 25
End: 2020-10-20
Attending: PSYCHIATRY & NEUROLOGY

## 2020-10-20 ENCOUNTER — APPOINTMENT (OUTPATIENT)
Dept: RADIOLOGY | Facility: MEDICAL CENTER | Age: 25
End: 2020-10-20
Attending: HOSPITALIST

## 2020-10-20 ENCOUNTER — HOSPITAL ENCOUNTER (OUTPATIENT)
Facility: MEDICAL CENTER | Age: 25
End: 2020-10-21
Attending: EMERGENCY MEDICINE | Admitting: HOSPITALIST

## 2020-10-20 DIAGNOSIS — R51.9 NONINTRACTABLE HEADACHE, UNSPECIFIED CHRONICITY PATTERN, UNSPECIFIED HEADACHE TYPE: ICD-10-CM

## 2020-10-20 DIAGNOSIS — R56.9 SEIZURE (HCC): ICD-10-CM

## 2020-10-20 DIAGNOSIS — G35 MULTIPLE SCLEROSIS (HCC): ICD-10-CM

## 2020-10-20 LAB
25(OH)D3 SERPL-MCNC: 42 NG/ML (ref 30–100)
ALBUMIN SERPL BCP-MCNC: 4.4 G/DL (ref 3.2–4.9)
ALBUMIN/GLOB SERPL: 1.4 G/DL
ALP SERPL-CCNC: 110 U/L (ref 30–99)
ALT SERPL-CCNC: 87 U/L (ref 2–50)
AMPHET UR QL SCN: NEGATIVE
ANION GAP SERPL CALC-SCNC: 13 MMOL/L (ref 7–16)
APPEARANCE UR: ABNORMAL
AST SERPL-CCNC: 42 U/L (ref 12–45)
BACTERIA #/AREA URNS HPF: ABNORMAL /HPF
BARBITURATES UR QL SCN: NEGATIVE
BASOPHILS # BLD AUTO: 0.3 % (ref 0–1.8)
BASOPHILS # BLD: 0.04 K/UL (ref 0–0.12)
BENZODIAZ UR QL SCN: NEGATIVE
BILIRUB SERPL-MCNC: 0.3 MG/DL (ref 0.1–1.5)
BILIRUB UR QL STRIP.AUTO: NEGATIVE
BUN SERPL-MCNC: 8 MG/DL (ref 8–22)
BZE UR QL SCN: NEGATIVE
CALCIUM SERPL-MCNC: 9.2 MG/DL (ref 8.5–10.5)
CANNABINOIDS UR QL SCN: NEGATIVE
CHLORIDE SERPL-SCNC: 103 MMOL/L (ref 96–112)
CO2 SERPL-SCNC: 21 MMOL/L (ref 20–33)
COLOR UR: YELLOW
COVID ORDER STATUS COVID19: NORMAL
CREAT SERPL-MCNC: 0.57 MG/DL (ref 0.5–1.4)
EOSINOPHIL # BLD AUTO: 0.01 K/UL (ref 0–0.51)
EOSINOPHIL NFR BLD: 0.1 % (ref 0–6.9)
EPI CELLS #/AREA URNS HPF: ABNORMAL /HPF
ERYTHROCYTE [DISTWIDTH] IN BLOOD BY AUTOMATED COUNT: 39.4 FL (ref 35.9–50)
GLOBULIN SER CALC-MCNC: 3.1 G/DL (ref 1.9–3.5)
GLUCOSE SERPL-MCNC: 142 MG/DL (ref 65–99)
GLUCOSE UR STRIP.AUTO-MCNC: NEGATIVE MG/DL
HCT VFR BLD AUTO: 41.2 % (ref 37–47)
HGB BLD-MCNC: 13.8 G/DL (ref 12–16)
HIV 1+2 AB+HIV1 P24 AG SERPL QL IA: NORMAL
HYALINE CASTS #/AREA URNS LPF: ABNORMAL /LPF
IMM GRANULOCYTES # BLD AUTO: 0.16 K/UL (ref 0–0.11)
IMM GRANULOCYTES NFR BLD AUTO: 1.2 % (ref 0–0.9)
KETONES UR STRIP.AUTO-MCNC: NEGATIVE MG/DL
LEUKOCYTE ESTERASE UR QL STRIP.AUTO: ABNORMAL
LYMPHOCYTES # BLD AUTO: 0.98 K/UL (ref 1–4.8)
LYMPHOCYTES NFR BLD: 7.3 % (ref 22–41)
MCH RBC QN AUTO: 28.6 PG (ref 27–33)
MCHC RBC AUTO-ENTMCNC: 33.5 G/DL (ref 33.6–35)
MCV RBC AUTO: 85.5 FL (ref 81.4–97.8)
METHADONE UR QL SCN: NEGATIVE
MICRO URNS: ABNORMAL
MONOCYTES # BLD AUTO: 0.15 K/UL (ref 0–0.85)
MONOCYTES NFR BLD AUTO: 1.1 % (ref 0–13.4)
NEUTROPHILS # BLD AUTO: 12.01 K/UL (ref 2–7.15)
NEUTROPHILS NFR BLD: 90 % (ref 44–72)
NITRITE UR QL STRIP.AUTO: NEGATIVE
NRBC # BLD AUTO: 0 K/UL
NRBC BLD-RTO: 0 /100 WBC
OPIATES UR QL SCN: NEGATIVE
OXYCODONE UR QL SCN: NEGATIVE
PCP UR QL SCN: NEGATIVE
PH UR STRIP.AUTO: 6 [PH] (ref 5–8)
PLATELET # BLD AUTO: 297 K/UL (ref 164–446)
PMV BLD AUTO: 10.9 FL (ref 9–12.9)
POTASSIUM SERPL-SCNC: 4.5 MMOL/L (ref 3.6–5.5)
PROPOXYPH UR QL SCN: NEGATIVE
PROT SERPL-MCNC: 7.5 G/DL (ref 6–8.2)
PROT UR QL STRIP: NEGATIVE MG/DL
RBC # BLD AUTO: 4.82 M/UL (ref 4.2–5.4)
RBC # URNS HPF: ABNORMAL /HPF
RBC UR QL AUTO: NEGATIVE
SARS-COV-2 RNA RESP QL NAA+PROBE: NOTDETECTED
SODIUM SERPL-SCNC: 137 MMOL/L (ref 135–145)
SP GR UR STRIP.AUTO: 1.01
SPECIMEN SOURCE: NORMAL
TREPONEMA PALLIDUM IGG+IGM AB [PRESENCE] IN SERUM OR PLASMA BY IMMUNOASSAY: NORMAL
TSH SERPL DL<=0.005 MIU/L-ACNC: 1.4 UIU/ML (ref 0.38–5.33)
UROBILINOGEN UR STRIP.AUTO-MCNC: 0.2 MG/DL
VIT B12 SERPL-MCNC: 1175 PG/ML (ref 211–911)
WBC # BLD AUTO: 13.4 K/UL (ref 4.8–10.8)
WBC #/AREA URNS HPF: ABNORMAL /HPF

## 2020-10-20 PROCEDURE — 700102 HCHG RX REV CODE 250 W/ 637 OVERRIDE(OP): Performed by: HOSPITALIST

## 2020-10-20 PROCEDURE — 99220 PR INITIAL OBSERVATION CARE,LEVL III: CPT | Performed by: HOSPITALIST

## 2020-10-20 PROCEDURE — C9803 HOPD COVID-19 SPEC COLLECT: HCPCS | Performed by: PSYCHIATRY & NEUROLOGY

## 2020-10-20 PROCEDURE — 95819 EEG AWAKE AND ASLEEP: CPT | Performed by: STUDENT IN AN ORGANIZED HEALTH CARE EDUCATION/TRAINING PROGRAM

## 2020-10-20 PROCEDURE — 70553 MRI BRAIN STEM W/O & W/DYE: CPT

## 2020-10-20 PROCEDURE — 99243 OFF/OP CNSLTJ NEW/EST LOW 30: CPT | Mod: GC,25 | Performed by: PSYCHIATRY & NEUROLOGY

## 2020-10-20 PROCEDURE — 36415 COLL VENOUS BLD VENIPUNCTURE: CPT

## 2020-10-20 PROCEDURE — 82306 VITAMIN D 25 HYDROXY: CPT

## 2020-10-20 PROCEDURE — 700117 HCHG RX CONTRAST REV CODE 255: Performed by: PSYCHIATRY & NEUROLOGY

## 2020-10-20 PROCEDURE — 700102 HCHG RX REV CODE 250 W/ 637 OVERRIDE(OP): Performed by: PSYCHIATRY & NEUROLOGY

## 2020-10-20 PROCEDURE — 86780 TREPONEMA PALLIDUM: CPT

## 2020-10-20 PROCEDURE — U0003 INFECTIOUS AGENT DETECTION BY NUCLEIC ACID (DNA OR RNA); SEVERE ACUTE RESPIRATORY SYNDROME CORONAVIRUS 2 (SARS-COV-2) (CORONAVIRUS DISEASE [COVID-19]), AMPLIFIED PROBE TECHNIQUE, MAKING USE OF HIGH THROUGHPUT TECHNOLOGIES AS DESCRIBED BY CMS-2020-01-R: HCPCS

## 2020-10-20 PROCEDURE — G0378 HOSPITAL OBSERVATION PER HR: HCPCS

## 2020-10-20 PROCEDURE — A9576 INJ PROHANCE MULTIPACK: HCPCS | Performed by: PSYCHIATRY & NEUROLOGY

## 2020-10-20 PROCEDURE — A9270 NON-COVERED ITEM OR SERVICE: HCPCS | Performed by: HOSPITALIST

## 2020-10-20 PROCEDURE — 84443 ASSAY THYROID STIM HORMONE: CPT

## 2020-10-20 PROCEDURE — 82607 VITAMIN B-12: CPT

## 2020-10-20 PROCEDURE — 96374 THER/PROPH/DIAG INJ IV PUSH: CPT

## 2020-10-20 PROCEDURE — 71045 X-RAY EXAM CHEST 1 VIEW: CPT

## 2020-10-20 PROCEDURE — A9270 NON-COVERED ITEM OR SERVICE: HCPCS | Performed by: PSYCHIATRY & NEUROLOGY

## 2020-10-20 PROCEDURE — 700105 HCHG RX REV CODE 258: Performed by: PSYCHIATRY & NEUROLOGY

## 2020-10-20 PROCEDURE — 99285 EMERGENCY DEPT VISIT HI MDM: CPT

## 2020-10-20 PROCEDURE — G0475 HIV COMBINATION ASSAY: HCPCS

## 2020-10-20 PROCEDURE — 95819 EEG AWAKE AND ASLEEP: CPT | Mod: 26 | Performed by: STUDENT IN AN ORGANIZED HEALTH CARE EDUCATION/TRAINING PROGRAM

## 2020-10-20 PROCEDURE — 80053 COMPREHEN METABOLIC PANEL: CPT

## 2020-10-20 PROCEDURE — 85025 COMPLETE CBC W/AUTO DIFF WBC: CPT

## 2020-10-20 PROCEDURE — 81001 URINALYSIS AUTO W/SCOPE: CPT

## 2020-10-20 PROCEDURE — 80307 DRUG TEST PRSMV CHEM ANLYZR: CPT

## 2020-10-20 PROCEDURE — 700111 HCHG RX REV CODE 636 W/ 250 OVERRIDE (IP): Performed by: PSYCHIATRY & NEUROLOGY

## 2020-10-20 PROCEDURE — 72156 MRI NECK SPINE W/O & W/DYE: CPT

## 2020-10-20 RX ORDER — LEVETIRACETAM 10 MG/ML
1000 INJECTION INTRAVASCULAR ONCE
Status: COMPLETED | OUTPATIENT
Start: 2020-10-20 | End: 2020-10-20

## 2020-10-20 RX ORDER — ACETAMINOPHEN 325 MG/1
650 TABLET ORAL EVERY 6 HOURS PRN
Status: DISCONTINUED | OUTPATIENT
Start: 2020-10-20 | End: 2020-10-21 | Stop reason: HOSPADM

## 2020-10-20 RX ORDER — AMOXICILLIN 250 MG
2 CAPSULE ORAL 2 TIMES DAILY
Status: DISCONTINUED | OUTPATIENT
Start: 2020-10-20 | End: 2020-10-21 | Stop reason: HOSPADM

## 2020-10-20 RX ORDER — BISACODYL 10 MG
10 SUPPOSITORY, RECTAL RECTAL
Status: DISCONTINUED | OUTPATIENT
Start: 2020-10-20 | End: 2020-10-21 | Stop reason: HOSPADM

## 2020-10-20 RX ORDER — LEVETIRACETAM 500 MG/1
500 TABLET ORAL 2 TIMES DAILY
Status: DISCONTINUED | OUTPATIENT
Start: 2020-10-20 | End: 2020-10-21 | Stop reason: HOSPADM

## 2020-10-20 RX ORDER — ONDANSETRON 4 MG/1
4 TABLET, ORALLY DISINTEGRATING ORAL EVERY 4 HOURS PRN
Status: DISCONTINUED | OUTPATIENT
Start: 2020-10-20 | End: 2020-10-20

## 2020-10-20 RX ORDER — SODIUM CHLORIDE 9 MG/ML
1000 INJECTION, SOLUTION INTRAVENOUS ONCE
Status: COMPLETED | OUTPATIENT
Start: 2020-10-20 | End: 2020-10-20

## 2020-10-20 RX ORDER — PROMETHAZINE HYDROCHLORIDE 25 MG/1
12.5-25 TABLET ORAL EVERY 4 HOURS PRN
Status: DISCONTINUED | OUTPATIENT
Start: 2020-10-20 | End: 2020-10-20

## 2020-10-20 RX ORDER — LEVETIRACETAM 500 MG/1
500 TABLET ORAL 2 TIMES DAILY
Status: DISCONTINUED | OUTPATIENT
Start: 2020-10-20 | End: 2020-10-20

## 2020-10-20 RX ORDER — PROMETHAZINE HYDROCHLORIDE 25 MG/1
12.5-25 SUPPOSITORY RECTAL EVERY 4 HOURS PRN
Status: DISCONTINUED | OUTPATIENT
Start: 2020-10-20 | End: 2020-10-20

## 2020-10-20 RX ORDER — BUTALBITAL, ACETAMINOPHEN AND CAFFEINE 50; 325; 40 MG/1; MG/1; MG/1
1 TABLET ORAL EVERY 6 HOURS PRN
Status: DISCONTINUED | OUTPATIENT
Start: 2020-10-20 | End: 2020-10-20

## 2020-10-20 RX ORDER — ONDANSETRON 2 MG/ML
4 INJECTION INTRAMUSCULAR; INTRAVENOUS EVERY 4 HOURS PRN
Status: DISCONTINUED | OUTPATIENT
Start: 2020-10-20 | End: 2020-10-21 | Stop reason: HOSPADM

## 2020-10-20 RX ORDER — LORAZEPAM 2 MG/ML
2 INJECTION INTRAMUSCULAR
Status: DISCONTINUED | OUTPATIENT
Start: 2020-10-20 | End: 2020-10-21 | Stop reason: HOSPADM

## 2020-10-20 RX ORDER — PROCHLORPERAZINE EDISYLATE 5 MG/ML
5-10 INJECTION INTRAMUSCULAR; INTRAVENOUS EVERY 4 HOURS PRN
Status: DISCONTINUED | OUTPATIENT
Start: 2020-10-20 | End: 2020-10-20

## 2020-10-20 RX ORDER — POLYETHYLENE GLYCOL 3350 17 G/17G
1 POWDER, FOR SOLUTION ORAL
Status: DISCONTINUED | OUTPATIENT
Start: 2020-10-20 | End: 2020-10-21 | Stop reason: HOSPADM

## 2020-10-20 RX ADMIN — SODIUM CHLORIDE 1000 ML: 9 INJECTION, SOLUTION INTRAVENOUS at 08:53

## 2020-10-20 RX ADMIN — GADOTERIDOL 19 ML: 279.3 INJECTION, SOLUTION INTRAVENOUS at 21:01

## 2020-10-20 RX ADMIN — LEVETIRACETAM INJECTION 1000 MG: 10 INJECTION INTRAVENOUS at 09:06

## 2020-10-20 RX ADMIN — ACETAMINOPHEN 650 MG: 325 TABLET, FILM COATED ORAL at 23:32

## 2020-10-20 RX ADMIN — LEVETIRACETAM 500 MG: 500 TABLET ORAL at 21:20

## 2020-10-20 ASSESSMENT — ENCOUNTER SYMPTOMS
BLURRED VISION: 1
NAUSEA: 0
CHILLS: 0
VOMITING: 0
SHORTNESS OF BREATH: 0
ABDOMINAL PAIN: 0
FEVER: 0
DIARRHEA: 0
DOUBLE VISION: 0
SORE THROAT: 0
COUGH: 0
LOSS OF CONSCIOUSNESS: 0
HEADACHES: 1
DIZZINESS: 0
PALPITATIONS: 0
BACK PAIN: 0
SEIZURES: 1

## 2020-10-20 ASSESSMENT — FIBROSIS 4 INDEX
FIB4 SCORE: 0.26
FIB4 SCORE: 0.41
FIB4 SCORE: 0.41

## 2020-10-20 ASSESSMENT — COGNITIVE AND FUNCTIONAL STATUS - GENERAL
MOBILITY SCORE: 24
SUGGESTED CMS G CODE MODIFIER MOBILITY: CH
SUGGESTED CMS G CODE MODIFIER DAILY ACTIVITY: CH
DAILY ACTIVITIY SCORE: 24

## 2020-10-20 ASSESSMENT — LIFESTYLE VARIABLES
HAVE YOU EVER FELT YOU SHOULD CUT DOWN ON YOUR DRINKING: NO
AVERAGE NUMBER OF DAYS PER WEEK YOU HAVE A DRINK CONTAINING ALCOHOL: 0
TOTAL SCORE: 0
HAVE PEOPLE ANNOYED YOU BY CRITICIZING YOUR DRINKING: NO
CONSUMPTION TOTAL: NEGATIVE
TOTAL SCORE: 0
HOW MANY TIMES IN THE PAST YEAR HAVE YOU HAD 5 OR MORE DRINKS IN A DAY: 0
ON A TYPICAL DAY WHEN YOU DRINK ALCOHOL HOW MANY DRINKS DO YOU HAVE: 0
TOTAL SCORE: 0
ALCOHOL_USE: NO
EVER HAD A DRINK FIRST THING IN THE MORNING TO STEADY YOUR NERVES TO GET RID OF A HANGOVER: NO
DOES PATIENT WANT TO STOP DRINKING: NO
EVER FELT BAD OR GUILTY ABOUT YOUR DRINKING: NO

## 2020-10-20 ASSESSMENT — PATIENT HEALTH QUESTIONNAIRE - PHQ9
1. LITTLE INTEREST OR PLEASURE IN DOING THINGS: NOT AT ALL
2. FEELING DOWN, DEPRESSED, IRRITABLE, OR HOPELESS: NOT AT ALL
SUM OF ALL RESPONSES TO PHQ9 QUESTIONS 1 AND 2: 0

## 2020-10-20 ASSESSMENT — PAIN DESCRIPTION - PAIN TYPE
TYPE: ACUTE PAIN
TYPE: ACUTE PAIN

## 2020-10-20 NOTE — ASSESSMENT & PLAN NOTE
Patient has had a total of 3 seizures over the course of approximately a year and a half.  There was no preceding trauma.  Her first seizure was at age 13, she did not have another 1 until age 24.  She was started on Keppra approximately 1 year ago, she had a breakthrough event in February.  It was thought at that time that she was noncompliant with her medications.  She reports to me that she has been compliant on this occasion with her Keppra.  Her seizure appears to be what is normal for her based on her report, and her sister who witnessed the event.  There are no concerning features in terms of evidence of infection or preceding trauma.  We will continue the Keppra 500 twice daily for the moment pending neurology evaluation.    Admit to neurologic unit, with seizure precautions.  Check MRI and await further recommendations from the neurology team.

## 2020-10-20 NOTE — H&P
"Hospital Medicine History & Physical Note    Date of Service  10/20/2020    Primary Care Physician  AKBAR Vazquez    Consultants  Neurology    Code Status  Full Code    Chief Complaint  Chief Complaint   Patient presents with   • Seizure     brought in by Ambulance from home in [place]. patient was seen at [facility] around 3 am for same. patient was treated at [facility] for headache and was DC'd right away. patient went home and had another seizure like acitvity lasted ~ 5 mins tonic / clonic as descirbed by family. Upon EMS arrival patient post ictal. Hx of Seizure and complaint with her medication ( keppra ). patient alert and oriented upon arrval to ED.       History of Presenting Illness  25 y.o. female who presented 10/20/2020 with a history of MS which has been untreated due to lack of insurance, migraine, and seizure disorder.  She had a seizure in isolation at age 13.  There was no preceding history of trauma or anything else.  Work-up at that time was negative, and she was not started on any medications.  Approximately 1 year ago was, she had a second seizure.  She underwent a second work-up, and was started on Keppra.  She did not have any further seizures until February of this year.  Since that time she has been seizure-free.  She has had a total of 4 seizures in her life.  This week she was experiencing migraine.  She describes pain located on the left side of her eye associated with visual scotomas and blurry vision on the right eye.  This is normal for her.  Her migraine was in fact a little less intense and what she would consider normal.  She took some over-the-counter medications for this and that seemed to help.  This morning she woke up and had more intense headache and was feeling \"off\".  She was seen at an [facility] emergency room, where she was given a shot of something, details of which unfortunately we do not have.  She was then discharged home after a total of an " hour in the ED, she did not have any imaging of her CNS.  On going home she noted some blurry vision in her right eye, which can oftentimes precede one of her seizures.  At this point she does not recall anything until waking up feeling confused with her sister at her side.  Her sister reports that the patient had a witnessed seizure, she states that she has seen her sister sees before and what she did on this occasion looked like what she had done on the previous occasions.  Patient has been maintained on Keppra 500 mg twice daily, she reports that she has been taking it.  She does not drink, or do illegal drugs.  She has had no recent trauma, has not been feeling ill in any way and specifically has not had any fever chills or nausea or vomiting.  Her headache is what she considers to be normal for her and is no different than usual.    Review of Systems  Review of Systems   Constitutional: Negative for chills and fever.   HENT: Negative for nosebleeds and sore throat.    Eyes: Positive for blurred vision. Negative for double vision.   Respiratory: Negative for cough and shortness of breath.    Cardiovascular: Negative for chest pain, palpitations and leg swelling.   Gastrointestinal: Negative for abdominal pain, diarrhea, nausea and vomiting.   Genitourinary: Negative for dysuria and urgency.   Musculoskeletal: Negative for back pain.   Skin: Negative for rash.   Neurological: Positive for seizures and headaches. Negative for dizziness and loss of consciousness.       Past Medical History   has a past medical history of Head ache, Migraine, and Seizure (HCC).    Surgical History   has no past surgical history on file.     Family History  family history is not on file.     Social History   reports that she has never smoked. She has never used smokeless tobacco. She reports that she does not drink alcohol or use drugs.    Allergies  Allergies   Allergen Reactions   • Nkda [No Known Drug Allergy]         Medications  Prior to Admission Medications   Prescriptions Last Dose Informant Patient Reported? Taking?   Cholecalciferol (VITAMIN D3) 125 MCG (5000 UT) Cap 10/19/2020 at AM Family Member Yes No   Sig: Take 5,000 Units by mouth every morning.   asa/apap/caffeine (EXCEDRIN) 250-250-65 MG Tab 10/19/2020 at AM Family Member Yes Yes   Sig: Take 2 Tabs by mouth every 6 hours as needed for Headache.   butalbital/APAP/caffeine -40 mg (FIORICET) -40 MG Tab UNK at UNK Family Member Yes No   Sig: Take 1 Tab by mouth every four hours as needed for Headache.   levETIRAcetam (KEPPRA) 500 MG Tab 10/19/2020 at AM Family Member No No   Sig: Take 1 Tab by mouth 2 Times a Day.      Facility-Administered Medications: None       Physical Exam  Temp:  [36.2 °C (97.1 °F)] 36.2 °C (97.1 °F)  Pulse:  [54-95] 61  Resp:  [16-34] 23  BP: (117-133)/(66-75) 131/66  SpO2:  [95 %-98 %] 96 %    Physical Exam  Vitals signs reviewed.   Constitutional:       General: She is not in acute distress.     Appearance: She is well-developed. She is not diaphoretic.   HENT:      Head: Normocephalic and atraumatic.   Neck:      Vascular: No JVD.   Cardiovascular:      Rate and Rhythm: Normal rate and regular rhythm.      Heart sounds: No murmur.   Pulmonary:      Effort: Pulmonary effort is normal. No respiratory distress.      Breath sounds: No stridor. No wheezing or rales.   Abdominal:      Palpations: Abdomen is soft.      Tenderness: There is no abdominal tenderness. There is no guarding or rebound.   Musculoskeletal:         General: No tenderness.      Right lower leg: No edema.      Left lower leg: No edema.   Skin:     General: Skin is warm and dry.      Findings: No rash.   Neurological:      General: No focal deficit present.      Mental Status: She is oriented to person, place, and time. Mental status is at baseline.   Psychiatric:         Mood and Affect: Mood normal.         Thought Content: Thought content normal.          Laboratory:  Recent Labs     10/20/20  0700   WBC 13.4*   RBC 4.82   HEMOGLOBIN 13.8   HEMATOCRIT 41.2   MCV 85.5   MCH 28.6   MCHC 33.5*   RDW 39.4   PLATELETCT 297   MPV 10.9     Recent Labs     10/20/20  0700   SODIUM 137   POTASSIUM 4.5   CHLORIDE 103   CO2 21   GLUCOSE 142*   BUN 8   CREATININE 0.57   CALCIUM 9.2     Recent Labs     10/20/20  0700   ALTSGPT 87*   ASTSGOT 42   ALKPHOSPHAT 110*   TBILIRUBIN 0.3   GLUCOSE 142*         No results for input(s): NTPROBNP in the last 72 hours.      No results for input(s): TROPONINT in the last 72 hours.    Imaging:  DX-CHEST-PORTABLE (1 VIEW)   Final Result      No acute cardiac or pulmonary abnormalities are identified.      MR-BRAIN-WITH & W/O    (Results Pending)   MR-CERVICAL SPINE-WITH & W/O    (Results Pending)   MR-BRAIN-WITH & W/O    (Results Pending)         Assessment/Plan:  I anticipate this patient is appropriate for observation status at this time.    Seizure (HCC)- (present on admission)  Assessment & Plan  Patient has had a total of 3 seizures over the course of approximately a year and a half.  There was no preceding trauma.  Her first seizure was at age 13, she did not have another 1 until age 24.  She was started on Keppra approximately 1 year ago, she had a breakthrough event in February.  It was thought at that time that she was noncompliant with her medications.  She reports to me that she has been compliant on this occasion with her Keppra.  Her seizure appears to be what is normal for her based on her report, and her sister who witnessed the event.  There are no concerning features in terms of evidence of infection or preceding trauma.  We will continue the Keppra 500 twice daily for the moment pending neurology evaluation.    Admit to neurologic unit, with seizure precautions.  Check MRI and await further recommendations from the neurology team.      MS (multiple sclerosis) (HCC)- (present on admission)  Assessment & Plan  Patient was  diagnosed with this approximately a year and a half ago.  She has not not had any further treatment or evaluation as she lacks insurance.  Neurology has been consulted  Check MRI  Look forward to further recommendations from the neurology team

## 2020-10-20 NOTE — ASSESSMENT & PLAN NOTE
Patient was diagnosed with this approximately a year and a half ago.  She has not not had any further treatment or evaluation as she lacks insurance.  Neurology has been consulted  Check MRI  Look forward to further recommendations from the neurology team

## 2020-10-20 NOTE — CARE PLAN
Problem: Communication  Goal: The ability to communicate needs accurately and effectively will improve  Outcome: PROGRESSING AS EXPECTED  Note: Encouraged use of call light, assessed needs, encouraged pt to voice feelings.        Problem: Safety  Goal: Will remain free from falls  Outcome: PROGRESSING AS EXPECTED  Note: Patient at risk for falls due to seizures, bed alarm on, walker out of sight, nonskid socks on, call light within reach, personal belongings within reach, toileting offered.

## 2020-10-20 NOTE — PROCEDURES
VIDEO ELECTROENCEPHALOGRAM REPORT      Referring provider: Ernesto Ortiz M.D    DOS: 10/20/20 (0 hours and 23 minutes of total recording time).     INDICATION:  Rosanna Ramires 25 y.o. female presenting with seizures    CURRENT ANTIEPILEPTIC REGIMEN: Keppra 500 mg BID    TECHNIQUE: 30 channel video electroencephalogram (EEG) was performed in accordance with the international 10-20 system. The study was reviewed in bipolar and referential montages. The recording examined the patient during wakeful and drowsy/sleep state(s).     DESCRIPTION OF THE RECORD:  During the wakefulness, the background was continuous and showed a symmetrical 9-10 Hz alpha posterior dominant rhythm.  There was reactivity to eye closure/opening.  A normal anterior-posterior gradient was noted with faster beta frequencies seen anteriorly.  During drowsiness, theta/delta frequencies were seen.    Sleep was captured and was characterized by diffuse background delta/theta activity with a loss of myogenic artifact.  N2 sleep transients in the form of symmetrical sleep spindles and vertex sharps were seen in the leads over the central regions.     ACTIVATION PROCEDURES:   Hyperventilation was performed by the patient for a total of 3 minutes. The technician performing the test noted good effort. This technique produced electroencephalographic changes in keeping with the expected bilaterally synchronous, frontally predominant, high amplitude slow waves build up.     Intermittent Photic stimulation was performed in a stepwise fashion from 1 to 30 Hz, and did not elicit any abnormal responses.    ICTAL AND INTERICTAL FINDINGS:   No focal or generalized epileptiform activity noted.     No regional slowing was seen during this routine study.      No clinical events or seizures were reported or recorded during the study.     EKG: sampling of the EKG recording did not demonstrate any abnormalities    EVENTS:  None    INTERPRETATION:  This is a normal  video EEG recording in the awake and drowsy/sleep state(s).  No epileptiform discharges or seizures were seen. Clinical correlation is recommended.    Note: This EEG does not rule out epilepsy.  If the clinical suspicion remains high for seizures, a prolonged recording to capture clinical or subclinical events may be helpful.        Shawn Aranda MD  Epilepsy and General Neurology  Department of Neurology  Clinical  of Neurology [facility].   Office: [phone]  Fax: [phone]

## 2020-10-20 NOTE — ED NOTES
Med Rec complete per phone interview with pt's sister  Allergies Reviewed  No ABX in the last 14 days

## 2020-10-20 NOTE — PROGRESS NOTES
Triage officer:  I discussed with Dr. Ervin. Ms. Jose Roberto Ramires has a hx of MS and seizure disorder on keppra 500 mg BID that was transferred from [facility] with seizure. Dr. Ortiz neurology will consult.  Full code status.  Dr. Saunders will admit.

## 2020-10-20 NOTE — ED PROVIDER NOTES
ED Provider Note    Scribed for Zbigniew Ervin M.D. by Yovana Carter. 10/20/2020, 7:14 AM.    Primary care provider: AKBAR Vazquez  Means of arrival: EMS  History obtained from: patient   History limited by: none    CHIEF COMPLAINT  Chief Complaint   Patient presents with   • Seizure     brought in by Ambulance from home in [place]. patient was seen at [facility] around 3 am for same. patient was treated at [place] for headache and was DC'd right away. patient went home and had another seizure like acitvity lasted ~ 5 mins tonic / clonic as descirbed by family. Upon EMS arrival patient post ictal. Hx of Seizure and complaint with her medication ( keppra ). patient alert and oriented upon arrval to ED.       PRATEEK Ramires is a 25 y.o. female who presents to the Emergency Department via ambulance for seizures onset this morning. She has additional complaints of a headache onset one week ago and states this headache is similar to her normal migraines. Patient takes Excedrin for her migraines, but did not have relief of her headache this time. Her friend describes the patient woke up at 3 am and wasn't feeling well so they presented to [facility] where she was treated and discharged an hour later. Once the patient was home she had a seizure and they called EMS. Denies fever, chills, runny nose, nausea, vomiting, dysuria, abdominal pain, or cough. She has a past medical history of seizure disorder, migraines, and MS. Patient is on 500 mg BID of Keppra and has been taking this regularly.    REVIEW OF SYSTEMS  Review of Systems   Constitutional: Negative for chills and fever.   HENT: Negative for congestion.    Respiratory: Negative for cough.    Gastrointestinal: Negative for abdominal pain, nausea and vomiting.   Genitourinary: Negative for dysuria.   Neurological: Positive for seizures and headaches.     PAST MEDICAL HISTORY   has a past medical history of Head ache, Migraine, and  "Seizure (HCC).    SURGICAL HISTORY  patient denies any surgical history    SOCIAL HISTORY  Social History     Tobacco Use   • Smoking status: Never Smoker   • Smokeless tobacco: Never Used   Substance Use Topics   • Alcohol use: No   • Drug use: No      Social History     Substance and Sexual Activity   Drug Use No       FAMILY HISTORY  History reviewed. No pertinent family history.    CURRENT MEDICATIONS  Home Medications     Reviewed by Conor Rodriguez (Pharmacy Tech) on 10/20/20 at 0844  Med List Status: Complete   Medication Last Dose Status   asa/apap/caffeine (EXCEDRIN) 250-250-65 MG Tab 10/19/2020 Active   butalbital/APAP/caffeine -40 mg (FIORICET) -40 MG Tab UNK Active   Cholecalciferol (VITAMIN D3) 125 MCG (5000 UT) Cap 10/19/2020 Active   levETIRAcetam (KEPPRA) 500 MG Tab 10/19/2020 Active                ALLERGIES  Allergies   Allergen Reactions   • Nkda [No Known Drug Allergy]        PHYSICAL EXAM  VITAL SIGNS: /72   Pulse 91   Temp 36.2 °C (97.1 °F) (Temporal)   Resp 16   Ht 1.676 m (5' 6\")   Wt 93 kg (205 lb)   LMP 10/15/2020   SpO2 97%   BMI 33.09 kg/m²     Constitutional:  No acute distress  HENT: Moist mucous membranes  Eyes: No conjunctivitis or icterus  Neck: trachea is midline, no palpable thyroid  Lymphatic: No cervical lymphadenopathy  Cardiovascular: Regular rate and rhythm, no murmurs  Thorax & Lungs: Normal breath sounds, no rhonchi  Abdomen: Soft, Non-tender  Skin:. no rash  Neurologic: Alert and oriented. Cranial nerves II-XII intact, EOMs intact, no tongue deviation, PERRL, no facial asymmetry to motor or sensation, symmetric palate, normal finger-to-nose test, no pronator drift. No focal motor deficits. Symmetric reflexes. Normal station and gait, normal tandem walk.    LABS  Labs Reviewed   CBC WITH DIFFERENTIAL - Abnormal; Notable for the following components:       Result Value    WBC 13.4 (*)     MCHC 33.5 (*)     Neutrophils-Polys 90.00 (*)     " Lymphocytes 7.30 (*)     Immature Granulocytes 1.20 (*)     Neutrophils (Absolute) 12.01 (*)     Lymphs (Absolute) 0.98 (*)     Immature Granulocytes (abs) 0.16 (*)     All other components within normal limits   COMP METABOLIC PANEL - Abnormal; Notable for the following components:    Glucose 142 (*)     ALT(SGPT) 87 (*)     Alkaline Phosphatase 110 (*)     All other components within normal limits   VITAMIN B12 - Abnormal; Notable for the following components:    Vitamin B12 -True Cobalamin 1175 (*)     All other components within normal limits   ESTIMATED GFR   TSH WITH REFLEX TO FT4   T.PALLIDUM AB EIA   HIV AG/AB COMBO ASSAY DIAGNOSTIC   VITAMIN D,25 HYDROXY   COVID/SARS COV-2    Narrative:     Is patient being admitted?->Yes  Does this patient meet criteria for Rush/Cepheid per [facility]  Inpatient Workflow? (See workflow link below)->No  Expected turn around time?->Routine (In-House PCR up to 24  hours)  Is this the patients First SARS CoV-2 test?->Unknown  Is this patient employed in healthcare?->Unknown  Is the patient symptomatic as defined by the CDC?->Unknown  Is the patient hospitalized?->Yes  Is the patient in the ICU?->No  Is the patient a resident in a congregate care setting?->No  Is the patient pregnant?->Unknown   SARS-COV-2, PCR (IN-HOUSE)    Narrative:     Is patient being admitted?->Yes  Does this patient meet criteria for Rush/Cepheid per [facility]  Inpatient Workflow? (See workflow link below)->No  Expected turn around time?->Routine (In-House PCR up to 24  hours)  Is this the patients First SARS CoV-2 test?->Unknown  Is this patient employed in healthcare?->Unknown  Is the patient symptomatic as defined by the CDC?->Unknown  Is the patient hospitalized?->Yes  Is the patient in the ICU?->No  Is the patient a resident in a congregate care setting?->No  Is the patient pregnant?->Unknown   URINALYSIS   URINE DRUG SCREEN     All labs reviewed by me.    COURSE & MEDICAL DECISION MAKING  Pertinent Labs &  Imaging studies reviewed. (See chart for details)    Reviewed patient's old medical records which showed she was brought in by EMS from [facility] for seizure activity x2. She has a history of seizure disorder and was last admitted on February 10th for headaches, seizures, and MS. Her seizures in February were secondary to noncompliance with Keppra.     7:14 AM - Patient seen and examined at bedside. I discussed that we will obtain basic labs however I do not feel that imaging is necessary. Additionally I discussed the benefits of Reglan for migraine and will give her a prescription for this at discharge. Ordered CBC w/ diff and CMP to evaluate her symptoms. The differential diagnoses include but are not limited to: assess electrolytes.     8:17 AM - Patient was reevaluated at bedside. Discussed lab results with the patient and informed them that her white count s slightly elevated however without any symptoms I am not concerned about this. I discussed that I will talk to neurology to assure there is no further treatment necessary and work on getting her discharged home. Paged Neurology.    8:31 AM - I discussed the patient's case and the above findings with Dr. Rosales (neuro) who would like to admit the patient. Paged hospitalist.    9:23 AM - I discussed the patient's case and the above findings with Dr. Mcnair (Hospitalist) who agreed to evaluate patient for hospitalization. Patients care will be transferred at this time.         DISPOSITION:  Patient will be hospitalized by Dr. Mcnair in guarded condition.    FINAL IMPRESSION  1. Seizure (HCC)    2. Nonintractable headache, unspecified chronicity pattern, unspecified headache type          I, Yovana Carter (Deanne), am scribing for, and in the presence of, Zbigniew Ervin M.D..    Electronically signed by: Yovana Carter (Dianaibe), 10/20/2020    IZbigniew M.D. personally performed the services described in this documentation, as scribed by Yovana Carter in  my presence, and it is both accurate and complete. E    The note accurately reflects work and decisions made by me.  Zbigniew Ervin M.D.  10/20/2020  1:27 PM

## 2020-10-20 NOTE — CONSULTS
"Neurology Initial Consult H&P  Neurohospitalist Service, [facility]    Referring Physician: Zbigniew Ervin M.D.    Chief Complaint   Patient presents with   • Seizure     brought in by Ambulance from home in [place]. patient was seen at [facility] around 3 am for same. patient was treated at [place] for headache and was DC'd right away. patient went home and had another seizure like acitvity lasted ~ 5 mins tonic / clonic as descirbed by family. Upon EMS arrival patient post ictal. Hx of Seizure and complaint with her medication ( keppra ). patient alert and oriented upon arrval to ED.       HPI: Rosanna Ramires is a 25 y.o. female with a history of multiple sclerosis diagnosed in 7/2019 by MRI and LP, seizure disorder who presented for seizures for whom neurology has been consulted for recurrent seizures. The patient reports that her first seizure was at the age of 13 with no inciting incident. In 7/2019, the patient had two subsequent seizures and was diagnosed with MS by MRI with demyelination and LP showing oligoclonal bands and increased IgG. The patient was started on Keppra 500mg BID at the time. The patient states that she normally takes Keppra 500mg BID for seizures. She reports that over the past few weeks prior to presenting to the hospital, she had not been taking her Keppra as prescribed. She reports that she had missed multiple doses and had been taking her Keppra usually once daily. Starting approximately one week ago, the patient began having headaches in the left temple of her head that were intermittent and dull. She also had associated \"dots\" obscuring her vision in her right eye. On the night of 10/19, the patient went to [place] due to refractory headaches, was prescribed Tylenol, a shot of an unknown medication, and was discharged home. Upon returning home, while sitting down, the patient had a witnessed generalized tonic clonic seizure that lasted " approximately one minute as she was sitting down. The patient did not hit her head or bite her tongue. The patient subsequently came to the hospital by ambulance. She was given IV solumedro    Subjective: Patient reports mild headache in left temple. She denies any focal weakness or confusion. Denies any nausea, vomiting, chest pain, or shortness of breath.     Review of systems: In addition to what is detailed in the HPI above, all other systems reviewed and are negative.    Past Medical History:    has a past medical history of Head ache, Migraine, and Seizure ([facility]). She also has no past medical history of Addisons disease (HCC), Adrenal disorder (HCC), Allergy, Anemia, Anxiety, Arrhythmia, Arthritis, ASTHMA, Blood transfusion, Cancer (HCC), CATARACT, CHF (congestive heart failure) (HCC), Clotting disorder (HCC), COPD, Cushings syndrome (HCC), Depression, Diabetes, Diabetic neuropathy (HCC), EMPHYSEMA, Encounter for long-term (current) use of other medications, GERD (gastroesophageal reflux disease), Glaucoma, Goiter, Headache(784.0), Heart attack (HCC), Heart murmur, HIV (human immunodeficiency virus infection), Hyperlipidemia, Hypertension, IBD (inflammatory bowel disease), Kidney disease, Meningitis, Muscle disorder, OSTEOPOROSIS, Parathyroid disorder (HCC), Pituitary disease (HCC), Sickle cell disease (HCC), Stroke (HCC), Substance abuse (HCC), Thyroid disease, Tuberculosis, Ulcer, or Urinary tract infection, site not specified.    FHx:  family history is not on file.    SHx:   reports that she has never smoked. She has never used smokeless tobacco. She reports that she does not drink alcohol or use drugs.    Allergies:  Allergies   Allergen Reactions   • Nkda [No Known Drug Allergy]        Medications:    Current Facility-Administered Medications:   •  levETIRAcetam (KEPPRA) tablet 500 mg, 500 mg, Oral, BID, Ernesto Ortiz M.D.  •  senna-docusate (PERICOLACE or SENOKOT S) 8.6-50 MG per tablet 2 Tab, 2 Tab,  Oral, BID **AND** polyethylene glycol/lytes (MIRALAX) PACKET 1 Packet, 1 Packet, Oral, QDAY PRN **AND** magnesium hydroxide (MILK OF MAGNESIA) suspension 30 mL, 30 mL, Oral, QDAY PRN **AND** bisacodyl (DULCOLAX) suppository 10 mg, 10 mg, Rectal, QDAY PRN, Nile Peng D.O.  •  acetaminophen (TYLENOL) tablet 650 mg, 650 mg, Oral, Q6HRS PRN, Nile Peng D.O.  •  ondansetron (ZOFRAN) syringe/vial injection 4 mg, 4 mg, Intravenous, Q4HRS PRN, RANDY Ley.O.  •  LORazepam (ATIVAN) injection 2 mg, 2 mg, Intravenous, Q5 MIN PRN, RANDY Ley.O.    Physical Examination:     Vitals:    10/20/20 1200 10/20/20 1230 10/20/20 1300 10/20/20 1545   BP: 129/71 121/72 124/67 116/70   Pulse: 60 (!) 58 (!) 54 66   Resp: (!) 30 (!) 27 16 16   Temp:   36.1 °C (97 °F) 36.2 °C (97.1 °F)   TempSrc:   Temporal Temporal   SpO2: 93% 95% 96% 97%   Weight:   94 kg (207 lb 3.7 oz)    Height:           General: Patient is awake and in no acute distress  Eyes: examination of optic disks not indicated at this time given acuity of consult  CV: RRR    NEUROLOGICAL EXAM:     Mental status: Awake, alert and fully oriented, follows commands  Speech and language: speech is not dysarthric. The patient is able to name and repeat.  Cranial nerve exam: No afferent pupillary defect. Pupils are equal, round and reactive to light bilaterally. Visual fields are full to blink to threat and peripheral vision testing. Extraocular muscles are intact. Sensation in the face is intact to light touch. Face is symmetric. Hearing to finger rub equal. Palate elevates symmetrically. Shoulder shrug is full. Tongue is midline.  Motor exam: Strength is 5/5 in all extremities both distally and proximally. Tone is normal. No abnormal movements were seen on exam.  Sensory exam: No sensory deficits identified   Deep tendon reflexes: 1+ and symmetric in bilateral patellar tendons. Toes down-going bilaterally.  Coordination: no  ataxia   Gait: deferred given patient preference    Objective Data:    Labs:  No results found for: PROTHROMBTM, INR   Lab Results   Component Value Date/Time    WBC 13.4 (H) 10/20/2020 07:00 AM    RBC 4.82 10/20/2020 07:00 AM    HEMOGLOBIN 13.8 10/20/2020 07:00 AM    HEMATOCRIT 41.2 10/20/2020 07:00 AM    MCV 85.5 10/20/2020 07:00 AM    MCH 28.6 10/20/2020 07:00 AM    MCHC 33.5 (L) 10/20/2020 07:00 AM    MPV 10.9 10/20/2020 07:00 AM    NEUTSPOLYS 90.00 (H) 10/20/2020 07:00 AM    LYMPHOCYTES 7.30 (L) 10/20/2020 07:00 AM    MONOCYTES 1.10 10/20/2020 07:00 AM    EOSINOPHILS 0.10 10/20/2020 07:00 AM    BASOPHILS 0.30 10/20/2020 07:00 AM    HYPOCHROMIA 1+ 12/27/2012 08:49 AM      Lab Results   Component Value Date/Time    SODIUM 137 10/20/2020 07:00 AM    POTASSIUM 4.5 10/20/2020 07:00 AM    CHLORIDE 103 10/20/2020 07:00 AM    CO2 21 10/20/2020 07:00 AM    GLUCOSE 142 (H) 10/20/2020 07:00 AM    BUN 8 10/20/2020 07:00 AM    CREATININE 0.57 10/20/2020 07:00 AM      Lab Results   Component Value Date/Time    CHOLSTRLTOT 188 08/20/2019 11:10 AM       Lab Results   Component Value Date/Time    ALKPHOSPHAT 110 (H) 10/20/2020 07:00 AM    ASTSGOT 42 10/20/2020 07:00 AM    ALTSGPT 87 (H) 10/20/2020 07:00 AM    TBILIRUBIN 0.3 10/20/2020 07:00 AM        Imaging/Testing:    I interpreted and/or reviewed the patient's neuroimaging    DX-CHEST-PORTABLE (1 VIEW)   Final Result      No acute cardiac or pulmonary abnormalities are identified.      MR-BRAIN-WITH & W/O    (Results Pending)   MR-CERVICAL SPINE-WITH & W/O    (Results Pending)       Assessment:    Rosanna Ramires is a 25 y.o. female with a PMH of seizure disorder on Keppra 500mg BID and multiple sclerosis diagnosed in 2018 on LP and MRI presenting for recurrent seizures whom neurology has been consulted for recurrent seizures. The patient's seizures are likely multifactorial and may primarily be secondary to non-adherence with Keppra. However, we will need to rule out  other precipitating causes. The patient may also be having an underlying MS flare. MRI Brain and MRI cervical spine will help assess stability of her demyelinating disease and rule out any other structural abnormalities which could be a focus of her seizures.  The patient has no afferent pupillary defect on physical exam, so we will hold off on MRI of orbits. If the patient's MRI Brain and/or C-spine shows new active demyelination, then we can consider starting IV solumedrol.    Plan:    MRI Brain with and without contrast  MRI Cervical Spine with and without contrast  EEG   Continue Keppra 500mg BID  Outpatient referral to demyelinating disease specialist Dr. Darrick Hatch.    Thank you for the consult.     The evaluation of the patient, and recommended management, was discussed with the attending neurologist.     Chang Mcclellan D.O. PGY-3

## 2020-10-20 NOTE — ED TRIAGE NOTES
Rosanna Ramires  25 y.o.  female  Chief Complaint   Patient presents with   • Seizure     brought in by Ambulance from home in [place]. patient was seen at [facility] around 3 am for same. patient was treated at [place] for headache and was DC'd right away. patient went home and had another seizure like acitvity lasted ~ 5 mins tonic / clonic as descirbed by family. Upon EMS arrival patient post ictal. Hx of Seizure and complaint with her medication ( keppra ). patient alert and oriented upon arrval to ED.

## 2020-10-21 VITALS
RESPIRATION RATE: 17 BRPM | DIASTOLIC BLOOD PRESSURE: 66 MMHG | TEMPERATURE: 97.1 F | BODY MASS INDEX: 33.69 KG/M2 | SYSTOLIC BLOOD PRESSURE: 119 MMHG | HEART RATE: 51 BPM | HEIGHT: 66 IN | OXYGEN SATURATION: 99 % | WEIGHT: 209.66 LBS

## 2020-10-21 LAB
ERYTHROCYTE [DISTWIDTH] IN BLOOD BY AUTOMATED COUNT: 41 FL (ref 35.9–50)
HCT VFR BLD AUTO: 39.5 % (ref 37–47)
HGB BLD-MCNC: 13.1 G/DL (ref 12–16)
MCH RBC QN AUTO: 28.7 PG (ref 27–33)
MCHC RBC AUTO-ENTMCNC: 33.2 G/DL (ref 33.6–35)
MCV RBC AUTO: 86.4 FL (ref 81.4–97.8)
PLATELET # BLD AUTO: 296 K/UL (ref 164–446)
PMV BLD AUTO: 11.1 FL (ref 9–12.9)
RBC # BLD AUTO: 4.57 M/UL (ref 4.2–5.4)
WBC # BLD AUTO: 15.1 K/UL (ref 4.8–10.8)

## 2020-10-21 PROCEDURE — 85027 COMPLETE CBC AUTOMATED: CPT

## 2020-10-21 PROCEDURE — 700111 HCHG RX REV CODE 636 W/ 250 OVERRIDE (IP): Performed by: HOSPITALIST

## 2020-10-21 PROCEDURE — 700102 HCHG RX REV CODE 250 W/ 637 OVERRIDE(OP): Performed by: PSYCHIATRY & NEUROLOGY

## 2020-10-21 PROCEDURE — A9270 NON-COVERED ITEM OR SERVICE: HCPCS | Performed by: HOSPITALIST

## 2020-10-21 PROCEDURE — 99217 PR OBSERVATION CARE DISCHARGE: CPT | Performed by: INTERNAL MEDICINE

## 2020-10-21 PROCEDURE — G0378 HOSPITAL OBSERVATION PER HR: HCPCS

## 2020-10-21 PROCEDURE — A9270 NON-COVERED ITEM OR SERVICE: HCPCS | Performed by: PSYCHIATRY & NEUROLOGY

## 2020-10-21 PROCEDURE — 700102 HCHG RX REV CODE 250 W/ 637 OVERRIDE(OP): Performed by: HOSPITALIST

## 2020-10-21 RX ADMIN — LEVETIRACETAM 500 MG: 500 TABLET ORAL at 04:27

## 2020-10-21 ASSESSMENT — PAIN DESCRIPTION - PAIN TYPE: TYPE: ACUTE PAIN

## 2020-10-21 NOTE — DISCHARGE INSTRUCTIONS
Seizure, Adult  A seizure is a sudden burst of abnormal electrical activity in the brain. Seizures usually last from 30 seconds to 2 minutes. The abnormal activity temporarily interrupts normal brain function. A seizure can cause many different symptoms depending on where in the brain it starts.  What are the causes?  Common causes of this condition include:  · Fever or infection.  · Brain abnormality, injury, bleeding, or tumor.  · Low blood sugar.  · Metabolic disorders or other conditions that are passed from parent to child (are inherited).  · Reaction to a substance, such as a drug or a medicine, or suddenly stopping the use of a substance (withdrawal).  · Stroke.  · Developmental disorders such as autism or cerebral palsy.  In some cases, the cause of this condition may not be known. Some people who have a seizure never have another one. Seizures usually do not cause brain damage or permanent problems unless they are prolonged. A person who has repeated seizures over time without a clear cause has a condition called epilepsy.  What increases the risk?  You are more likely to develop this condition if you have:  · A family history of epilepsy.  · Had a tonic-clonic seizure in the past. This is a type of seizure that involves whole-body contraction of muscles and a loss of consciousness.  · Autism, cerebral palsy, or other brain disorders.  · A history of head trauma, lack of oxygen at birth, or strokes.  What are the signs or symptoms?  There are many different types of seizures. The symptoms of a seizure vary depending on the type of seizure you have. Examples of symptoms during a seizure include:  · Uncontrollable shaking (convulsions).  · Stiffening of the body.  · Loss of consciousness.  · Head nodding.  · Staring.  · Not responding to sound or touch.  · Loss of bladder or bowel control.  Some people have symptoms right before a seizure happens (aura) and right after a seizure happens (postictal).  Symptoms  before a seizure may include:  · Fear or anxiety.  · Nausea.  · Feeling like the room is spinning (vertigo).  · A feeling of having seen or heard something before (déjà vu).  · Odd tastes or smells.  · Changes in vision, such as seeing flashing lights or spots.  Symptoms after a seizure may include:  · Confusion.  · Sleepiness.  · Headache.  · Weakness on one side of the body.  How is this diagnosed?  This condition may be diagnosed based on:  · A description of your symptoms. Video of your seizures can be helpful.  · Your medical history.  · A physical exam.  You may also have tests, including:  · Blood tests.  · CT scan.  · MRI.  · Electroencephalogram (EEG). This test measures electrical activity in the brain. An EEG can predict whether seizures will return (recur).  · A spinal tap (also called a lumbar puncture). This is the removal and testing of fluid that surrounds the brain and spinal cord.  How is this treated?  Most seizures will stop on their own in under 5 minutes, and no treatment is needed. Seizures that last longer than 5 minutes will usually need treatment. Treatment can include:  · Medicines given through an IV.  · Avoiding known triggers, such as medicines that you take for another condition.  · Medicines to treat epilepsy (antiepileptics), if epilepsy caused your seizures.  · Surgery to stop seizures, if you have epilepsy that does not respond to medicines.  Follow these instructions at home:  Medicines  · Take over-the-counter and prescription medicines only as told by your health care provider.  · Avoid any substances that may prevent your medicine from working properly, such as alcohol.  Activity  · Do not drive, swim, or do any other activities that would be dangerous if you had another seizure. Wait until your health care provider says it is safe to do them.  · If you live in the [place] check with your local DMV (department of motor vehicles) to find out about local driving laws. Each state  has specific rules about when you can legally return to driving.  · Get enough rest. Lack of sleep can make seizures more likely to occur.  Educating others  Teach friends and family what to do if you have a seizure. They should:  · Lay you on the ground to prevent a fall.  · Cushion your head and body.  · Loosen any tight clothing around your neck.  · Turn you on your side. If vomiting occurs, this helps keep your airway clear.  · Not hold you down. Holding you down will not stop the seizure.  · Not put anything into your mouth.  · Know whether or not you need emergency care. For example, they should get help right away if you have a seizure that lasts longer than 5 minutes or have several seizures in a row.  · Stay with you until you recover.    General instructions  · Contact your health care provider each time you have a seizure.  · Avoid anything that has ever triggered a seizure for you.  · Keep a seizure diary. Record what you remember about each seizure, especially anything that might have triggered the seizure.  · Keep all follow-up visits as told by your health care provider. This is important.  Contact a health care provider if:  · You have another seizure.  · You have seizures more often.  · Your seizure symptoms change.  · You continue to have seizures with treatment.  · You have symptoms of an infection or illness. This might increase your risk of having a seizure.  Get help right away if:  · You have a seizure that:  ? Lasts longer than 5 minutes.  ? Is different than previous seizures.  ? Leaves you unable to speak or use a part of your body.  ? Makes it harder to breathe.  · You have:  ? A seizure after a head injury.  ? Multiple seizures in a row.  ? Confusion or a severe headache right after a seizure.  · You do not wake up immediately after a seizure.  · You injure yourself during a seizure.  These symptoms may represent a serious problem that is an emergency. Do not wait to see if the symptoms  will go away. Get medical help right away. Call your local emergency services (911 in the [place] Do not drive yourself to the hospital.  Summary  · Seizures are caused by abnormal electrical activity in the brain. The activity disrupts normal brain function and can cause various symptoms, such as convulsions, abnormal movements, or a change in consciousness.  · There are many causes of seizures, including illnesses, medicines, genetic conditions, head injuries, strokes, tumors, substance abuse, or substance withdrawal.  · Most seizures will stop on their own in under 5 minutes. Seizures that last longer than 5 minutes are a medical emergency and require immediate treatment.  · Many medicines are used to treat seizures. Take over-the-counter and prescription medicines only as told by your health care provider.  This information is not intended to replace advice given to you by your health care provider. Make sure you discuss any questions you have with your health care provider.  Document Released: 12/15/2001 Document Revised: 03/06/2020 Document Reviewed: 03/06/2020  [employer] Patient Education © 2020 [employer]      Discharge Instructions    Discharged to home by car with relative. Discharged via wheelchair, hospital escort: Yes.  Special equipment needed: Not Applicable    Be sure to schedule a follow-up appointment with your primary care doctor or any specialists as instructed.     Discharge Plan:   Diet Plan: Discussed  Activity Level: Discussed  Confirmed Follow up Appointment: Patient to Call and Schedule Appointment  Confirmed Symptoms Management: Discussed  Medication Reconciliation Updated: Yes  Influenza Vaccine Indication: Not indicated: Previously immunized this influenza season and > 8 years of age    I understand that a diet low in cholesterol, fat, and sodium is recommended for good health. Unless I have been given specific instructions below for another diet, I accept this instruction as my diet  prescription.   Other diet: regular    Special Instructions: None    · Is patient discharged on Warfarin / Coumadin?   No     Depression / Suicide Risk    As you are discharged from this [facility] facility, it is important to learn how to keep safe from harming yourself.    Recognize the warning signs:  · Abrupt changes in personality, positive or negative- including increase in energy   · Giving away possessions  · Change in eating patterns- significant weight changes-  positive or negative  · Change in sleeping patterns- unable to sleep or sleeping all the time   · Unwillingness or inability to communicate  · Depression  · Unusual sadness, discouragement and loneliness  · Talk of wanting to die  · Neglect of personal appearance   · Rebelliousness- reckless behavior  · Withdrawal from people/activities they love  · Confusion- inability to concentrate     If you or a loved one observes any of these behaviors or has concerns about self-harm, here's what you can do:  · Talk about it- your feelings and reasons for harming yourself  · Remove any means that you might use to hurt yourself (examples: pills, rope, extension cords, firearm)  · Get professional help from the community (Mental Health, Substance Abuse, psychological counseling)  · Do not be alone:Call your Safe Contact- someone whom you trust who will be there for you.  · Call your local CRISIS HOTLINE 581-9247 or 902-267-9396  · Call your local Children's Mobile Crisis Response Team [place] (371) 818-2917 or www.[employer]  · Call the toll free National Suicide Prevention Hotlines   · National Suicide Prevention Lifeline 058-883-SVYW (9038)  · National Hope Line Network 800-SUICIDE (612-5768)

## 2020-10-21 NOTE — PROGRESS NOTES
Chart reviewed.     MRI Brain W/WO CST w/o evidence of acute exacerbation. MRI C spine reviewed, no evidence of acute demyelination on my review. If radiology sees evidence of this, then would initiate Solu-Medrol.     EEG normal.     Continue Keppra, since patient was noncompliant with medications. DMV report to be filed.     Outpt Neurology referral placed.     At this time, no further recommendations. Please call w/ any further questions or concerns, of if there is recurrence of seizure like activity.

## 2020-10-21 NOTE — PROGRESS NOTES
Patient transferred to the discharge lounge via wheelchair. Patients IV removed. Pt provided discharge instructions, verbalized understanding. Pt provided information about [facility] and how to contact them for assistance. Patient escorted to ride.

## 2020-10-21 NOTE — CARE PLAN
Problem: Communication  Goal: The ability to communicate needs accurately and effectively will improve  Outcome: PROGRESSING AS EXPECTED  Note: Encouraged use of call light, assessed needs, encouraged pt to voice feelings.        Problem: Safety  Goal: Will remain free from injury  Outcome: PROGRESSING AS EXPECTED  Note: Patient at risk for falls due to seizures, seizure precautions in place, bed alarm on, nonskid socks on, call light within reach, personal belongings within reach, toileting offered.

## 2020-10-21 NOTE — PROGRESS NOTES
2 RN skin check completed.    Skin is intact.   No skin issues/breakdown noted.   Patient turns self side to side.     Moisturizer in use.

## 2020-10-21 NOTE — DISCHARGE SUMMARY
"Discharge Summary    CHIEF COMPLAINT ON ADMISSION  Chief Complaint   Patient presents with   • Seizure     brought in by Ambulance from home in [place]. patient was seen at [facility] around 3 am for same. patient was treated at [facility] for headache and was DC'd right away. patient went home and had another seizure like acitvity lasted ~ 5 mins tonic / clonic as descirbed by family. Upon EMS arrival patient post ictal. Hx of Seizure and complaint with her medication ( keppra ). patient alert and oriented upon arrval to ED.       Reason for Admission  EMS     Admission Date  10/20/2020    CODE STATUS  Full Code    HPI & HOSPITAL COURSE  25 y.o. female who presented 10/20/2020 with a history of MS which has been untreated due to lack of insurance, migraine, and seizure disorder.  She had a seizure in isolation at age 13.  There was no preceding history of trauma or anything else.  Work-up at that time was negative, and she was not started on any medications.  Approximately 1 year ago was, she had a second seizure.  She underwent a second work-up, and was started on Keppra.  She did not have any further seizures until February of this year.  Since that time she has been seizure-free.  She has had a total of 4 seizures in her life.  This week she was experiencing migraine.  She describes pain located on the left side of her eye associated with visual scotomas and blurry vision on the right eye.  This is normal for her.  Her migraine was in fact a little less intense and what she would consider normal.  She took some over-the-counter medications for this and that seemed to help.  This morning she woke up and had more intense headache and was feeling \"off\".  She was seen at an [facility] emergency room, where she was given a shot of something, details of which unfortunately we do not have.  She was then discharged home after a total of an hour in the ED, she did not have any imaging of her CNS.  On going home " she noted some blurry vision in her right eye, which can oftentimes precede one of her seizures.  At this point she does not recall anything until waking up feeling confused with her sister at her side.  Her sister reports that the patient had a witnessed seizure, she states that she has seen her sister sees before and what she did on this occasion looked like what she had done on the previous occasions.  Patient has been maintained on Keppra 500 mg twice daily, she reports that she has been taking it.  She does not drink, or do illegal drugs.  She has had no recent trauma, has not been feeling ill in any way and specifically has not had any fever chills or nausea or vomiting.  Her headache is what she considers to be normal for her and is no different than usual.  Neurology was consulted and recommended EEG and MRI of the brain.  EEG was normal and MRI of the brain did not show any acute finding.  Neurology recommended that the patient be discharged with continue Keppra and follow-up with them as an outpatient.  DMV report will be filed before discharge.  I saw and examined the patient upon discharge.  Please call [phone] to schedule PCP appointment for patient.    Required specialty appointments include:     As below       Therefore, she is discharged in fair and stable condition to home with close outpatient follow-up.        Discharge Date  10/21/20      FOLLOW UP ITEMS POST DISCHARGE      DISCHARGE DIAGNOSES  Active Problems:    MS (multiple sclerosis) (HCC) POA: Yes      Overview: IMO load March 2020    Seizure (HCC) POA: Yes  Resolved Problems:    * No resolved hospital problems. *      FOLLOW UP  No future appointments.  AKBAR Vazquez  [address]  [place] [address] [phone]    In 1 week      Carlo Rawls M.D.  [address] [place] [id number] [phone]    In 1 week        MEDICATIONS ON DISCHARGE     Medication List      CONTINUE taking these  medications      Instructions   asa/apap/caffeine 250-250-65 MG Tabs  Commonly known as: EXCEDRIN   Take 2 Tabs by mouth every 6 hours as needed for Headache.  Dose: 2 Tab     butalbital/apap/caffeine -40 mg -40 MG Tabs  Commonly known as: Fioricet   Take 1 Tab by mouth every four hours as needed for Headache.  Dose: 1 Tab     levETIRAcetam 500 MG Tabs  Commonly known as: KEPPRA   Take 1 Tab by mouth 2 Times a Day.  Dose: 500 mg     vitamin D3 125 MCG (5000 UT) Caps   Take 5,000 Units by mouth every morning.  Dose: 5,000 Units            Allergies  Allergies   Allergen Reactions   • Nkda [No Known Drug Allergy]        DIET  Orders Placed This Encounter   Procedures   • Diet Order Regular     Standing Status:   Standing     Number of Occurrences:   1     Order Specific Question:   Diet:     Answer:   Regular [1]       ACTIVITY  As tolerated.  Weight bearing as tolerated    CONSULTATIONS  neuro    PROCEDURES      LABORATORY  Lab Results   Component Value Date    SODIUM 137 10/20/2020    POTASSIUM 4.5 10/20/2020    CHLORIDE 103 10/20/2020    CO2 21 10/20/2020    GLUCOSE 142 (H) 10/20/2020    BUN 8 10/20/2020    CREATININE 0.57 10/20/2020        Lab Results   Component Value Date    WBC 13.4 (H) 10/20/2020    HEMOGLOBIN 13.8 10/20/2020    HEMATOCRIT 41.2 10/20/2020    PLATELETCT 297 10/20/2020        Total time of the discharge process exceeds 38 minutes.

## 2020-10-21 NOTE — PROGRESS NOTES
Patient discharged to discharge [facility], all belongings accounted for. Form faxed to DMV.

## 2020-10-27 ENCOUNTER — OFFICE VISIT (OUTPATIENT)
Dept: NEUROLOGY | Facility: MEDICAL CENTER | Age: 25
End: 2020-10-27

## 2020-10-27 VITALS
HEIGHT: 63 IN | BODY MASS INDEX: 36.41 KG/M2 | OXYGEN SATURATION: 99 % | WEIGHT: 205.47 LBS | DIASTOLIC BLOOD PRESSURE: 76 MMHG | HEART RATE: 72 BPM | TEMPERATURE: 98.4 F | SYSTOLIC BLOOD PRESSURE: 108 MMHG

## 2020-10-27 DIAGNOSIS — G35 MULTIPLE SCLEROSIS (HCC): Primary | ICD-10-CM

## 2020-10-27 PROCEDURE — 99215 OFFICE O/P EST HI 40 MIN: CPT | Performed by: PSYCHIATRY & NEUROLOGY

## 2020-10-27 RX ORDER — SUMATRIPTAN 50 MG/1
50 TABLET, FILM COATED ORAL
Qty: 10 TAB | Refills: 3 | Status: SHIPPED | OUTPATIENT
Start: 2020-10-27 | End: 2023-10-24

## 2020-10-27 RX ORDER — LEVETIRACETAM 500 MG/1
500 TABLET ORAL 2 TIMES DAILY
Qty: 60 TAB | Refills: 3 | Status: SHIPPED | OUTPATIENT
Start: 2020-10-27 | End: 2021-01-25

## 2020-10-27 ASSESSMENT — FIBROSIS 4 INDEX: FIB4 SCORE: 0.41

## 2020-10-27 NOTE — PROGRESS NOTES
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)  Diagnosis of MS: n/a  Disease History:  - 2008: first event consistent with seizure  - 7/2019: second episode consistent with seizure  - 2/2020: third event consistent with seizure  - 10/21/2020: fourth event consistent with seizure  Disease course at onset: RIS (vs CIS)  Current disease course: RIS (vs CIS)  Previous disease therapies:  - none  Current disease therapies:  - none  Symptomatic therapies:  - none  CSF (7/18/2019):  - WBCs: 3  - RBCs: 6  - protein: 31  - glucose: 56  - oligoclonal bands: 12  Other Testing:  - none  MRI head:  - 10/20/2020: stable lesion burden; no additional lesions; no enhancement  - 7/16/2019: T2-FLAIR lesions in the parveen-ventricular and sub-cortical regions; no enhancement  - 2/8/2008: no visible lesions  MRI cervical spine:  - 10/20/2020: no visible lesions  MRI thoracic spine:  - never imaged    CC: \"multiple sclerosis\"    HISTORY OF ILLNESS:  Rosanna Ramires is a 25 y.o. woman with a history most notable for abnormal MRI of the head, headaches, and seizures.  Today, she was unaccompanied, and she provided the following history:    2008:  Rosanna had a seizure-like episode.  She doesn't recall the details of this event.  There was no headache prior to the event, but she did have headache afterward.  She may have been prescribed Keppra, and she may have taken this for 1-2 months afterward, but she isn't certain.  There were no additional events until 2019.    7/2019:  Rosanna had another event.  She had a severe headache that lasted 3-4 days.  Her sleep was poor.  There was associated photophobia.  She saw \"spots\" in the right eye alone (covered one eye and then the other).  She took acetaminophen, but this was ineffective.  The next day she had a seizure-like episode.  She " "doesn't recall the event.  She lost awareness and woke up confused.  She was taken to the ED where she had a second event.  There was shaking of the extremities.  She \"twiched\" to the right side.  There were copious oral secretions.  Workup included an MRI scan which showed lesions concerning for MS.    2/2020:  There was another seizure-like episode.  This was preceded by 2-3 days of headache and poor sleep.  There were spots in the right eye.  She came to the ED at [facility], and it was there that the event occurred.    10/21/2020:  Rosanna had another headache.  This had lasted for ~7 days.  She tried treating this with Excedrin.  This was helpful, but the headache kept returning.  She was able to sleep.  She went to the ED in [place] but was quickly discharged.  She was about to come to the [facility] ED when she had a seizure-like episode.  She lost awareness.  There was shaking for 1-2 minutes.  There were no copious secretions.  Her family called the EMS.  She was confused after the event.  EMS brought her to [facility].  Workup included MRI and EEG.    A review of MS-related symptoms was notable for the following:    Fatigue: yes; every now and then Rosanna has generalized fatigue; she works from 05:00 until 15:00.  Weakness: no  Numbness: no  Incoordination: no  Spasms/Spasticity: no  Vision Impairment: no  Walking/Balance Problems: no  Neuralgia: no  Bowel Symptoms: no  Bladder Symptoms: no  Heat Sensitivity: no  Depression: no  Cognitive/Memory Problems: no  Sexual Dysfunction: no  Anxiety: yes; Rosanna lives alone with her son; when she had her headache last Monday, she felt \"the need to cry.\"    MEDICAL AND SURGICAL HISTORY:  Past Medical History:   Diagnosis Date   • Head ache    • Migraine    • Seizure (HCC)     last  one was 2 years ago, not on medication     History reviewed. No pertinent surgical history.    MEDICATIONS:  Current Outpatient Medications   Medication Sig   • levETIRAcetam (KEPPRA) 500 MG Tab Take 1 " Tab by mouth 2 Times a Day for 90 days.   • SUMAtriptan (IMITREX) 50 MG Tab Take 1 Tab by mouth Once PRN for Migraine for up to 1 dose.   • asa/apap/caffeine (EXCEDRIN) 250-250-65 MG Tab Take 2 Tabs by mouth every 6 hours as needed for Headache.   • butalbital/APAP/caffeine -40 mg (FIORICET) -40 MG Tab Take 1 Tab by mouth every four hours as needed for Headache.   • Cholecalciferol (VITAMIN D3) 125 MCG (5000 UT) Cap Take 5,000 Units by mouth every morning.     SOCIAL HISTORY:  Social History     Tobacco Use   • Smoking status: Never Smoker   • Smokeless tobacco: Never Used   Substance Use Topics   • Alcohol use: No     Social History     Social History Narrative   • Not on file     FAMILY HISTORY:  History reviewed. No pertinent family history.    REVIEW OF SYSTEMS:  A ROS was completed.  Pertinent positives and negatives were included in the HPI, above.  All other systems were reviewed and are negative.    PHYSICAL EXAM:  General/Medical:  - NAD  - hair, skin, nails, and joints were normal  - neck was supple without Lhermitte's phenomenon  - heart rate and rhythm were regular    Neuro:  MENTAL STATUS: awake and alert; no deficits of speech or language; oriented to person, place, and time; affect was appropriate to situation    CRANIAL NERVES:    II: acuity was: J1/J1; fields intact to confrontation; pupils 3/3 to 2/2 without a relative afferent pupillary defect; discs sharp; no red desaturation noted    III/IV/VI: versions intact without nystagmus    V: facial sensation symmetric to light touch    VII: facial expression symmetric    VIII: hearing intact to finger rub    IX/X: palate elevates symmetrically    XI: shoulder shrug symmetric    XII: tongue midline    MOTOR:  - bulk and tone were normal throughout  Upper Extremity Strength  (R/L)    5/5   Elbow flexion 5/5   Elbow extension 5/5   Shoulder abduction 5/5     Lower Extremity Strength  (R/L)   Hip flexion 5/5   Knee extension 5/5   Knee  "flexion 5/5   Ankle plantarflexion 5/5   Ankle dorsiflexion 5/5     - can walk on toes and heels  - no pronator drift; no abnormal movements    SENSATION:  - light touch:  - vibration (R/L, seconds): 13/13 at the great toes  - pinprick: NT  - proprioception: NT  - Romberg: absent    COORDINATION:  - finger to nose was normal, no ataxia on exam  - finger tapping was rapid and accurate, bilaterally    REFLEXES:  Reflex Right Left   BR 1+ 1+   Biceps 1+ 1+   Triceps 1+ 1+   Patellae 1+ 1+   Achilles 1+ 1+   Toes down mute     GAIT:  - narrow base and normal  - heel-raised and toe-raised gait: intact  - tandem gait: intact    QUANTITATIVE SCORES:  Timed 25-foot walk (sec): 3.7.  Assistive device: none    REVIEW OF IMAGING STUDIES: I reviewed the following studies:  MRI Brain:  Date: 10/20/2020  W/o and w/ contrast?: yes  Indication: \"Multiple sclerosis, monitor\"  Comparison: \"7/17/19\"  Impression:  \"1) There are multifocal abnormal T2 hyperintensities in the subcortical and periventricular white matter consistent with chronic demyelinating plaques of multiple sclerosis. There has been no significant interval change.  2) There is an approximately 6 mm sized stable calcification noted along the cortical surface of the left occipital lobe. This may represent chronic sequelae of granulomatous infection.  3) There has been no significant change.\"    Date: 7/16/2019  W/o and w/ contrast?: yes  Indication: \"herpesviral infection\"  Comparison: 2/8/2008  Impression:  \"1) There are multifocal abnormal T2 hyperintensities in the subcortical and periventricular white matter. There is no abnormal contrast enhancement. This is new since the previous MRI dated 2/8/2008.  These findings likely represents chronic demyelinating plaques.  2) Stable an approximately 6 mm sized parenchymal calcification in the left occipital lobe likely representing chronic sequelae previous granulomatous infection. This is stable since the previous MRI " "dated 2/8/2008.\"    Date: 2/8/2008  W/o and w/ contrast?: yes  Indication: \"seizure\"  Comparison: none  Impression:  \"NEGATIVE BRAIN MRI WITHOUT AND WITH CONTRAST.\"    MRI Cervical Spine:  Date: 10/20/2020  W/o and w/ contrast?: yes  Indication: \"Multiple sclerosis, monitor.\"  Comparison: none  Impression:  \"Contrast enhanced MRI of the cervical spine demonstrates no cord signal abnormality or stenosis.\"    MRI Thoracic Spine:  None    REVIEW OF LABORATORY STUDIES:  7/18/2019 (CSF):  - WBCs: 3  - RBCs: 6  - protein: 31  - glucose: 56  - oligoclonal bands: 12    ASSESSMENT:  Rosanna Ramires is a 25 y.o. woman with radiologically isolated syndrome (pre-MS) and seizures.  To my knowledge Rosanna has never had a clinical event which would qualify as a \"relapse,\" and there are no slowly-worsening symptoms which might suggest progressive disease.  She does have parveen-ventricular lesions and oligoclonal bands, so her risk of eventually meeting criteria for MS if probably high.  I discussed this diagnostic dilemma with Rosanna at length.  We agreed to repeat MRI of the brain w/o and w/ contrast and obtain baseline imaging of the thoracic cord in 6 months.  Since my suspicion of MS is high, I offered her the option of starting a low- to medium-potency immunotherapy now.  She declined in favor of active observation.  I will see her again in ~6 months to review imaging results.  In the meantime, she will contact the clinic if neurologic symptoms arise.    Rosanna will continue Keppra 500 mg BID for seizure prophylaxis.  She will follow up with Barbi Saunders in 3 months to complete DMV paperwork.    I prescribed sumatriptan for her migraines.  I'd rather her use this than Fioricet.    PLAN:  RIS/Early MS:  - repeat MRI head w/o and w/ contrast in 6 months  - MRI thoracic spine w/o and w/ contrast in 6 months (baseline)  - contact the clinic if neurologic symptoms emerge    Seizures:  - continue Keppra (levetiracetam) 500 mg " BID    Migraine Headaches:  - sumatriptan 50 mg PRN  - avoid Fioricet    Follow-Up:  - Return in about 3 months (around 1/27/2021).    Signed: Darrick aHtch M.D. at 7:52 AM on 10/27/20    BILLING DOCUMENTATION:   I spent 60 minutes face-to-face with this patient.  Over 50% of this time was spent on counseling and/or coordination of care wtih the patient and/or family.  Specifically, we discussed the possible diagnosis of MS as well as management of MS, seizures, and migraine headaches.

## 2021-02-02 ENCOUNTER — OFFICE VISIT (OUTPATIENT)
Dept: NEUROLOGY | Facility: MEDICAL CENTER | Age: 26
End: 2021-02-02
Attending: REGISTERED NURSE

## 2021-02-02 VITALS
SYSTOLIC BLOOD PRESSURE: 128 MMHG | TEMPERATURE: 97.8 F | WEIGHT: 205.03 LBS | BODY MASS INDEX: 36.33 KG/M2 | HEIGHT: 63 IN | DIASTOLIC BLOOD PRESSURE: 80 MMHG | HEART RATE: 67 BPM | OXYGEN SATURATION: 98 %

## 2021-02-02 DIAGNOSIS — R56.9 SEIZURES (HCC): ICD-10-CM

## 2021-02-02 DIAGNOSIS — G35 MULTIPLE SCLEROSIS (HCC): ICD-10-CM

## 2021-02-02 DIAGNOSIS — G44.001 INTRACTABLE CLUSTER HEADACHE SYNDROME, UNSPECIFIED CHRONICITY PATTERN: ICD-10-CM

## 2021-02-02 DIAGNOSIS — E55.9 VITAMIN D DEFICIENCY: ICD-10-CM

## 2021-02-02 PROCEDURE — 99215 OFFICE O/P EST HI 40 MIN: CPT | Performed by: REGISTERED NURSE

## 2021-02-02 PROCEDURE — 99211 OFF/OP EST MAY X REQ PHY/QHP: CPT | Performed by: REGISTERED NURSE

## 2021-02-02 RX ORDER — LEVETIRACETAM 500 MG/1
500 TABLET ORAL 2 TIMES DAILY
COMMUNITY
End: 2021-02-02 | Stop reason: SDUPTHER

## 2021-02-02 RX ORDER — LEVETIRACETAM 500 MG/1
500 TABLET ORAL 2 TIMES DAILY
Qty: 60 TAB | Refills: 11 | Status: SHIPPED | OUTPATIENT
Start: 2021-02-02 | End: 2022-01-21 | Stop reason: SDUPTHER

## 2021-02-02 ASSESSMENT — FIBROSIS 4 INDEX: FIB4 SCORE: 0.41

## 2021-02-02 ASSESSMENT — PATIENT HEALTH QUESTIONNAIRE - PHQ9: CLINICAL INTERPRETATION OF PHQ2 SCORE: 0

## 2021-02-02 NOTE — PROGRESS NOTES
[facility]  MULTIPLE SCLEROSIS & NEUROIMMUNOLOGY  FOLLOW-UP VISIT    DISEASE SUMMARY:  Per Dr. 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)  Diagnosis of MS: n/a  Disease History:  - 2008: first event consistent with seizure  - 7/2019: second episode consistent with seizure  - 2/2020: third event consistent with seizure  - 10/21/2020: fourth event consistent with seizure  Disease course at onset: RIS (vs CIS)  Current disease course: RIS (vs CIS)  Previous disease therapies:  - none  Current disease therapies:  - none  Symptomatic therapies:  - none  CSF (7/18/2019):  - WBCs: 3  - RBCs: 6  - protein: 31  - glucose: 56  - oligoclonal bands: 12  Other Testing:  - none  MRI head:  - 10/20/2020: stable lesion burden; no additional lesions; no enhancement  - 7/16/2019: T2-FLAIR lesions in the parveen-ventricular and sub-cortical regions; no enhancement  - 2/8/2008: no visible lesions  MRI cervical spine:  - 10/20/2020: no visible lesions  MRI thoracic spine:  - never imaged      CC: Seizures and DMV paperwork    INTERVAL HISTORY:  Rosanna Ramires is a 25 y.o.female  with a history of a CIS (MS), headaches and seizures.   Dr. Hatch last saw Ms. Ramires in the Neurology Clinic on 10/27/2020 . At that time he recommended Keppra 500 mg BID.  Today, Rosanna was alone  and she provided  the following interval history:    No s/s of MS related symptoms  No seizures since 10/21/2020  Taking her Keppra regularly    A review of MS-related symptoms was notable for the following:    Fatigue: no  Weakness: no  Numbness: no  Incoordination: no  Spasms/Spasticity: no  Vision Impairment: no  Walking/Balance Problems: no  Neuralgia: no  Bowel Symptoms:     Bladder Symptoms: no  Heat Sensitivity: no  Depression: no  Cognitive/Memory Problems: no    Sexual Dysfunction: N/A  Anxiety: no    MEDICATIONS:  Current Outpatient Medications   Medication Sig   • SUMAtriptan (IMITREX) 50 MG Tab  Take 1 Tab by mouth Once PRN for Migraine for up to 1 dose.   • asa/apap/caffeine (EXCEDRIN) 250-250-65 MG Tab Take 2 Tabs by mouth every 6 hours as needed for Headache.   • butalbital/APAP/caffeine -40 mg (FIORICET) -40 MG Tab Take 1 Tab by mouth every four hours as needed for Headache.   • Cholecalciferol (VITAMIN D3) 125 MCG (5000 UT) Cap Take 5,000 Units by mouth every morning.     MEDICAL, SOCIAL, AND FAMILY HISTORY:  There is no change in the patient's ROS or PFSH from their previous visit on 10/27/2020.  REVIEW OF SYSTEMS:  A ROS was completed.  Pertinent positives and negatives were included in the HPI, above.  All other systems were reviewed and are negative.    PHYSICAL EXAM:  General/Medical:  - NAD  - hair, skin, nails, and joints were normal  - neck was supple without Lhermitte's phenomenon  - heart rate and rhythm were regular, no carotid bruits appreciated    Neuro:  MENTAL STATUS: awake and alert; no deficits of speech or language; oriented to person, place, and time; affect was appropriate to situation    CRANIAL NERVES:    II:  pupils 3/3 to 2/2 without a relative afferent pupillary defect,    III/IV/VI: versions intact without nystagmus    V: facial sensation symmetric to light touch    VII: facial expression symmetric    VIII: hearing intact to finger rub    IX/X: palate elevates symmetrically    XI: shoulder shrug symmetric    XII: tongue midline    MOTOR:  - bulk and tone were normal throughout  Upper Extremity Strength  (R/L)    5/5   Elbow flexion 5/5   Elbow extension 5/5   Shoulder abduction 5/5     Lower Extremity Strength  (R/L)   Hip flexion 5/5   Knee extension 5/5   Knee flexion 5/5   Ankle plantarflexion 5/5   Ankle dorsiflexion 5/5     -  can walk on toes and heels  -  no pronator drift; no abnormal movements    SENSATION:  - light touch: equal to bilateral sides of body  - vibration (R/L, seconds): 13/13 on 10/27/2020 at the great toes  - pinprick: N/T  -  "proprioception: N/T  - Romberg: absent    COORDINATION:  - finger to nose was normal, no ataxia on exam  - finger tapping was rapid and accurate, bilaterally    REFLEXES:  Reflex Right Left   BR 2+ 2+   Biceps 2+ 2+   Triceps 2+ 2+   Patellae 2+ 2+   Achilles 2+ 2+   Toes down mute     GAIT:  - narrow base and normal  - heel-raised and toe-raised gait: intact  - tandem gait: intact    QUANTITATIVE SCORES:  Timed 25-foot walk (sec): 3.7 on 10/27/2020  Assistive device:NONE none    REVIEW OF IMAGING STUDIES: I reviewed the following studies:  MRI Brain:  Date: 10/20/2020  Date: 10/20/2020  W/o and w/ contrast?: yes  Indication: \"Multiple sclerosis, monitor\"  Comparison: \"7/17/19\"  Impression:  \"1) There are multifocal abnormal T2 hyperintensities in the subcortical and periventricular white matter consistent with chronic demyelinating plaques of multiple sclerosis. There has been no significant interval change.  2) There is an approximately 6 mm sized stable calcification noted along the cortical surface of the left occipital lobe. This may represent chronic sequelae of granulomatous infection.  3) There has been no significant change.\"     Date: 7/16/2019  W/o and w/ contrast?: yes  Indication: \"herpesviral infection\"  Comparison: 2/8/2008  Impression:  \"1) There are multifocal abnormal T2 hyperintensities in the subcortical and periventricular white matter. There is no abnormal contrast enhancement. This is new since the previous MRI dated 2/8/2008.  These findings likely represents chronic demyelinating plaques.  2) Stable an approximately 6 mm sized parenchymal calcification in the left occipital lobe likely representing chronic sequelae previous granulomatous infection. This is stable since the previous MRI dated 2/8/2008.\"      MRI Cervical Spine:  Date: 10/20/2020  MRI Cervical Spine:  Date: 10/20/2020  W/o and w/ contrast?: yes  Indication: \"Multiple sclerosis, monitor.\"  Comparison: " "none  Impression:  \"Contrast enhanced MRI of the cervical spine demonstrates no cord signal abnormality or stenosis.\"       REVIEW OF LABORATORY STUDIES:  Results for ROSANNA SALTER (MRN [id number]) as of 2/2/2021 12:31   Ref. Range 10/21/2020 07:59   WBC Latest Ref Range: 4.8 - 10.8 K/uL 15.1 (H)   RBC Latest Ref Range: 4.20 - 5.40 M/uL 4.57   Hemoglobin Latest Ref Range: 12.0 - 16.0 g/dL 13.1   Hematocrit Latest Ref Range: 37.0 - 47.0 % 39.5   MCV Latest Ref Range: 81.4 - 97.8 fL 86.4   MCH Latest Ref Range: 27.0 - 33.0 pg 28.7   MCHC Latest Ref Range: 33.6 - 35.0 g/dL 33.2 (L)   RDW Latest Ref Range: 35.9 - 50.0 fL 41.0   Platelet Count Latest Ref Range: 164 - 446 K/uL 296   MPV Latest Ref Range: 9.0 - 12.9 fL 11.1   Oligoclonal Bands CSF  MULTIPLE SCLEROSIS PROFILE  Collected: 07/18/19 1350   Result status: Final   Resulting lab: [facility]   Reference range: 0 - 1 Bands   Value: 12High          ASSESSMENT:  Rosanna Ramires is a 25 y.o. female with seizures and radiologically isolated syndrome.  Here today for DMV paperwork.  Has not had a seizure since 10/21/2020.  + Periventricular lesion via MRI and + oliglonal bands (12) on LP.  Today she does have any reoccurring s/s of weakness or relapse.  I reviewed with patient what to do if she experiences new neurologic symptoms    PLAN:  Paperwork done  Medication refilled keppra and Vit D     Follow-Up:  - Follow up three months Dr. Hatch and have MRI's    My total time spent caring for the patient on the day of the encounter was 40 minutes.   This does not include time spent on separately billable procedures/tests.      Signed: AKBAR Chao at 8:27 AM on 02/02/21    "

## 2021-09-09 NOTE — ED NOTES
Ambulated to restroom with standby assist, steady gate.    [facility] Progress Note      Subjective  Reports she's sleeping more but she likes it.  She cannot tell me if she's sleeping at night.  Abdomen is feeling better.  Agreeable to walking with staff on floor.  10 point review of systems done and negative.      Medications  Current Facility-Administered Medications   Medication Dose Route Frequency Provider Last Rate Last Admin   • cloZAPine (CLOZARIL) tablet 275 mg  275 mg Per NG tube QHS Eliezer Flores,    275 mg at 09/08/21 2051   • sodium chloride (PF) 0.9 % injection 10 mL  10 mL Injection PRN Trinity Saba MD       • sodium chloride (PF) 0.9 % injection 10 mL  10 mL Injection 2 times per day Trinity Saba MD   10 mL at 09/08/21 2103   • vancomycin 1,250 mg in sodium chloride 0.9% 250 mL IVPB  1,250 mg Intravenous 2 times per day Romelia Boyd  mL/hr at 09/09/21 0955 1,250 mg at 09/09/21 0955   • acetaminophen (TYLENOL) tablet 650 mg  650 mg Oral Q4H PRN Zheng Cohcran MD   650 mg at 09/08/21 1853   • VANCOMYCIN - PHARMACIST MONITORED Misc   Does not apply See Admin Instructions Romelia Boyd MD       • meropenem (MERREM) 1 g in sodium chloride 0.9 % 100 mL IVPB  1 g Intravenous 3 times per day Romelia Boyd MD 33.3 mL/hr at 09/09/21 0650 1 g at 09/09/21 0650   • micafungin (MYCAMINE) 100 mg in sodium chloride 0.9 % 100 mL IVPB  100 mg Intravenous Q24H Romelia Boyd  mL/hr at 09/09/21 1226 100 mg at 09/09/21 1226   • potassium CHLORIDE (KLOR-CON) packet 40 mEq  40 mEq Oral Once Ant Mcnally       • potassium CHLORIDE (KLOR-CON M) pollo ER tablet 40 mEq  40 mEq Oral Daily with breakfast Zheng Cochran MD   40 mEq at 09/09/21 0842   • OXcarbazepine (TRILEPTAL) tablet 300 mg  300 mg Oral QHS Leonel Humphrey DO   300 mg at 09/08/21 2053   • clonazePAM (KlonoPIN) tablet 1 mg  1 mg Per NG tube QHS Bryan Butler MD   1 mg at 09/08/21 2051   • sertraline (ZOLOFT) tablet 200 mg  200 mg Per NG tube Daily Bryan Butler MD   200 mg at 09/09/21 0842   •  famotidine (PEPCID) injection 20 mg  20 mg Intravenous 2 times per day Bryan Butler MD   20 mg at 09/09/21 0842   • sodium chloride 0.9 % flush bag 25 mL  25 mL Intravenous PRN Ashlie Malave CNP       • sodium chloride (PF) 0.9 % injection 2 mL  2 mL Intracatheter 2 times per day Ashlie Malave CNP   2 mL at 09/07/21 2101   • morphine injection 2 mg  2 mg Intravenous Q4H PRN Ashlie Malave CNP   2 mg at 09/07/21 1314   • LORazepam (ATIVAN) injection 0.5 mg  0.5 mg Intravenous Q6H PRN Ashlie Malave CNP   0.5 mg at 08/31/21 0957   • ondansetron (ZOFRAN) injection 4 mg  4 mg Intravenous Q6H PRN Ashlie E Frieda CNP       • enoxaparin (LOVENOX) injection 40 mg  40 mg Subcutaneous Q24H Bryan Butler MD   40 mg at 09/08/21 2056   • docusate sodium (ENEMEEZ MINI) 283 MG rectal enema 283 mg  283 mg Rectal Once Bryan Butler MD       • iohexol (OMNIPAQUE 300) contrast solution 50 mL  50 mL Injection Once Sergio Laureano MD              Vitals:     Vitals with min/max:      Vital Last Value 24 Hour Range   Temperature 97.7 °F (36.5 °C) (09/09/21 0426) Temp  Min: 97.7 °F (36.5 °C)  Max: 98.6 °F (37 °C)   Pulse 82 (09/09/21 0426) Pulse  Min: 82  Max: 90   Respiratory 16 (09/09/21 0426) Resp  Min: 16  Max: 16   Non-Invasive  Blood Pressure 114/74 (09/09/21 0426) BP  Min: 92/61  Max: 114/74   Pulse Oximetry 98 % (09/09/21 0800) SpO2  Min: 96 %  Max: 98 %   Arterial   Blood Pressure   No data recorded         Physical Exam  Physical Exam  Constitutional:       General: She is not in acute distress.     Appearance: Normal appearance. She is well-developed. She is obese. She is not ill-appearing, toxic-appearing or diaphoretic.   HENT:      Head: Normocephalic and atraumatic.      Right Ear: External ear normal.      Left Ear: External ear normal.      Nose: Nose normal.      Mouth/Throat:      Mouth: Mucous membranes are moist.   Eyes:      Conjunctiva/sclera: Conjunctivae normal.      Pupils: Pupils are equal,  round, and reactive to light.   Neck:      Vascular: No carotid bruit.   Cardiovascular:      Rate and Rhythm: Regular rhythm.   Pulses: Normal pulses.      Heart sounds: Normal heart sounds. No murmur heard.   No gallop.    Pulmonary:      Effort: Pulmonary effort is normal. No respiratory distress.      Breath sounds: Normal breath sounds. No wheezing.      Comments: On room air lungs clear  Abdominal:      General: Bowel sounds are normal. There is no distension.      Palpations: Abdomen is soft.      Tenderness: There is no abdominal tenderness.      Comments: IR drain placed mid lower abdomen mild tenderness.  And new one right-sided pelvic region present with minimum drainage.  Musculoskeletal:         General: Normal range of motion.      Cervical back: Normal range of motion and neck supple. No rigidity or tenderness.      Right lower leg: No edema.      Left lower leg: No edema.   Lymphadenopathy:      Cervical: No cervical adenopathy.   Skin:     General: Skin is warm and dry.      Capillary Refill: Capillary refill takes less than 2 seconds.   Neurological:      General: No focal deficit present.      Mental Status: She is alert. Mental status is at baseline.      Deep Tendon Reflexes: Reflexes are normal and symmetric.         Imaging  CT ABDOMEN PELVIS W CONTRAST  1.  Multiple dilated loops of fluid-filled small bowel which gradually taper to normal caliber in the right lower quadrant.  The multiple loops of distal small bowel located in the right lower quadrant appear thickened and are surrounded by a moderate amount of inflammation.  The appendix is slightly obscured by the inflammation in the pelvis, but appears thickened measuring up to 1.3 cm, most likely representing acute appendicitis with subsequent ileus.  There is a moderate amount of free fluid in the pelvis which demonstrates peripheral rim enhancement suggestive of abscess/infection.  There is no free intraperitoneal air. Electronically  Signed by: KIM RODRIGUEZ M.D. Signed on: 8/29/2021 9:10 PM   Addendum Date: 8/30/2021    Addendum: Addendum is only to add a possible differential diagnosis, given that there are multiple loops of thickened and enhancing distal small bowel, if there is a history of inflammatory bowel disease, this could alternatively represent small bowel inflammation leading to secondary inflammation of the appendix and proximal bowel ileus/partial obstruction in the appropriate clinical setting.  Electronically Signed by: KIM RODRIGUEZ M.D. Signed on: 8/30/2021 8:13 AM           Labs     Recent Labs   Lab 09/09/21  0325 09/09/21  0324 09/08/21  0351 09/07/21  0613 09/06/21  0654   WBC  --  11.2* 18.0* 19.7*  --    HCT  --  32.4* 32.2* 32.1*  --    HGB  --  10.0* 9.9* 10.1*  --    PLT  --  403 398 362  --    INR  --   --   --  1.0  --    PTT  --   --   --  25  --    SODIUM 141  --   --  138 138   POTASSIUM 3.6  --   --  3.5 3.5   CHLORIDE 107  --   --  106 106   CO2 30  --   --  25 25   CALCIUM 8.5  --   --  8.6 8.8   GLUCOSE 88  --   --  106* 84   BUN 8  --   --  8 5*   CREATININE 0.49*  --   --  0.59 0.53   AST  --   --   --  16  --    GPT  --   --   --  22  --    ALKPT  --   --   --  73  --    BILIRUBIN  --   --   --  0.2  --    ALBUMIN  --   --   --  2.0*  --          Assessment and Plan:    Acute perforated appendicitis with complicated multiple pelvic and abdominal abscesses   S/p IR guided drainage (8/30); repeat drainage (9/07)  Pelvic abscess fluid bacterial cx (8/30) grew rare Clostridium cadaveris, final  Fluid cytology from drainage (9/07): Negative for malignancy; Reactive mesothelial cells and abundant acute inflammatory cells  S/p Midline insertion (9/08)   - Pelvic cavity fluid bacterial cx (9/07) growing very rare E. Coli, pending   - Abx/Antifungals: IV vancomycin (9/05-TBD), IV meropenem (9/05-TBD), IV micafungin (9/05-TBD)   - Supportive care: IVFs (NS@100mL/hr), PRN Zofran, bowel regimen   - GenSx is  following   - ID following, will adjust abx based on cultures    SIRS criteria with concern for Sepsis 2/2 above   - As evidenced by tachycardia (resolved), leukocytosis (improving)   - Abx as above   - ID is following    Leukocytosis 2/2 above, improving   - WBC 18.0-> 11.2 (9/09)   - Abx and antifungals as above per ID    Acute normocytic anemia, mild, stable   - Hgb 10.0 (9/09), continue to monitor     Right arm pain and swelling likely due to superficial venous thrombosis of R basilic vein in forearm, ruled out acute DVT  RUE Venous Duplex (9/08):  No evidence of acute DVT in the right upper extremity. Superficial venous thrombosis of the right basilic vein in the forearm.   - Continue to monitor, continue DVT ppx with Lovenox    Hypokalemia - resolved, s/p repletion  Azotemia - resolved  Elevated AST - resolved               #Schizophrenia   #Unspecified Anxiety Disorder  She's sleeping more during the day   - Continue PRN IV Ativan    - Continue home meds: trileptal, klonopin, clozapine, zoloft (meds via [facility])   - DC'd cogentin per psych secondary to sedation   - Psyc following; needs inpatient psych on discharge to adjust medications.      DIET: Low residue   DVT PROPHYLAXIS: SCDs, SQ Lovenox      Code Status: Full Resuscitation    Disposition: Inpatient psych once stable medically.  Will not be able to go on IV antibiotics.      Primary Care Physician  Torito Austin MD    Charting performed by jenifer Proctor for Dr.Gina Calderon    All medical record entries made by the jenifer were at my direction. I have reviewed the chart and agree that the record accurately reflects my personal performance of the history, physical exam, hospital course, and assessment and plan.

## 2022-01-21 ENCOUNTER — OFFICE VISIT (OUTPATIENT)
Dept: NEUROLOGY | Facility: MEDICAL CENTER | Age: 27
End: 2022-01-21
Attending: REGISTERED NURSE

## 2022-01-21 VITALS
BODY MASS INDEX: 36.25 KG/M2 | WEIGHT: 204.59 LBS | SYSTOLIC BLOOD PRESSURE: 118 MMHG | DIASTOLIC BLOOD PRESSURE: 76 MMHG | HEIGHT: 63 IN

## 2022-01-21 DIAGNOSIS — E55.9 VITAMIN D DEFICIENCY: ICD-10-CM

## 2022-01-21 DIAGNOSIS — R56.9 SEIZURES (HCC): ICD-10-CM

## 2022-01-21 PROCEDURE — 99211 OFF/OP EST MAY X REQ PHY/QHP: CPT | Performed by: REGISTERED NURSE

## 2022-01-21 PROCEDURE — 99214 OFFICE O/P EST MOD 30 MIN: CPT | Performed by: REGISTERED NURSE

## 2022-01-21 RX ORDER — LEVETIRACETAM 500 MG/1
500 TABLET ORAL 2 TIMES DAILY
Qty: 60 TABLET | Refills: 11 | Status: SHIPPED | OUTPATIENT
Start: 2022-01-21 | End: 2023-10-24 | Stop reason: SDUPTHER

## 2022-01-21 ASSESSMENT — PATIENT HEALTH QUESTIONNAIRE - PHQ9: CLINICAL INTERPRETATION OF PHQ2 SCORE: 0

## 2022-01-21 ASSESSMENT — FIBROSIS 4 INDEX: FIB4 SCORE: 0.43

## 2022-01-21 NOTE — PROGRESS NOTES
Chief Complaint   Patient presents with   • Follow-Up     seizure       Problem List Items Addressed This Visit     None      Visit Diagnoses     Seizures (HCC)        Relevant Medications    levETIRAcetam (KEPPRA) 500 MG Tab    Other Relevant Orders    VITAMIN D,25 HYDROXY          History of present illness:  Rosanna Ramires 26 y.o. female presents today for DMV paperwork for a history of seizures.  Her last seizure was 10/21/2020.  She also has RIS and no new symptoms. Never been on DMT.   Proibably failed to keep appointments secondary to self pay.    Driving status: Currently driving    School/work status: Working at [employer]      Past medical history:   Past Medical History:   Diagnosis Date   • Head ache    • Migraine    • Seizure (HCC)     last  one was 2 years ago, not on medication       Past surgical history:   History reviewed. No pertinent surgical history.    Family history:   History reviewed. No pertinent family history.    Social history:   Social History     Socioeconomic History   • Marital status: Single     Spouse name: Not on file   • Number of children: Not on file   • Years of education: Not on file   • Highest education level: Not on file   Occupational History   • Not on file   Tobacco Use   • Smoking status: Never Smoker   • Smokeless tobacco: Never Used   Vaping Use   • Vaping Use: Never used   Substance and Sexual Activity   • Alcohol use: No   • Drug use: No   • Sexual activity: Not Currently     Partners: Male   Other Topics Concern   • Not on file   Social History Narrative   • Not on file     Social Determinants of Health     Financial Resource Strain:    • Difficulty of Paying Living Expenses: Not on file   Food Insecurity:    • Worried About Running Out of Food in the Last Year: Not on file   • Ran Out of Food in the Last Year: Not on file   Transportation Needs:    • Lack of Transportation (Medical): Not on file   • Lack of Transportation (Non-Medical): Not on file    Physical Activity:    • Days of Exercise per Week: Not on file   • Minutes of Exercise per Session: Not on file   Stress:    • Feeling of Stress : Not on file   Social Connections:    • Frequency of Communication with Friends and Family: Not on file   • Frequency of Social Gatherings with Friends and Family: Not on file   • Attends Anabaptist Services: Not on file   • Active Member of Clubs or Organizations: Not on file   • Attends Club or Organization Meetings: Not on file   • Marital Status: Not on file   Intimate Partner Violence:    • Fear of Current or Ex-Partner: Not on file   • Emotionally Abused: Not on file   • Physically Abused: Not on file   • Sexually Abused: Not on file   Housing Stability:    • Unable to Pay for Housing in the Last Year: Not on file   • Number of Places Lived in the Last Year: Not on file   • Unstable Housing in the Last Year: Not on file       Current medications:   Current Outpatient Medications   Medication   • levETIRAcetam (KEPPRA) 500 MG Tab   • Cholecalciferol (VITAMIN D3) 125 MCG (5000 UT) Cap   • SUMAtriptan (IMITREX) 50 MG Tab   • asa/apap/caffeine (EXCEDRIN) 250-250-65 MG Tab     No current facility-administered medications for this visit.       Medication Allergy:  Allergies   Allergen Reactions   • Nkda [No Known Drug Allergy]          Review of systems:   Constitutional: denies fever, night sweats, weight loss, or malaise/fatigue.   Eyes: denies acute vision change, eye pain or secretion.   Ears, Nose, Mouth, Throat: denies nasal secretion, nasal bleeding, difficulty swallowing, hearing loss, tinnitus, vertigo, ear pain, oral ulcers or lesions.   Endocrine: denies recent weight changes, heat or cold intolerance, polyuria, polydypsia, polyphagia,abnormal hair growth.  Cardiovascular: denies new onset of chest pain, palpitations, syncope, lower extremity edema, or dyspnea of exertion.  Pulmonary: denies shortness of breath, new onset of cough, hemoptysis, wheezing, chest  "pain or flu-like symptoms.   GI: denies nausea, vomiting, diarrhea, GI bleeding, change in appetite, abdominal pain, and change in bowel habits.  : denies urinary incontinence, retention or hematuria.  Heme/oncology: denies history of easy bruising or bleeding. No history of cancer. Allergy/immunology: denies hives/urticarial.  Dermatologic: denies new rash.  Musculoskeletal:denies joint swelling or pain, muscle pain, neck and back pain. Neurologic: denies headaches, acute visual changes, facial droopiness, muscle weakness (focal or generalized), paresthesias, anesthesia, ataxia, change in speech or language, memory loss, abnormal movements, seizures, loss of consciousness, or episodes of confusion.   Psychiatric: denies symptoms of depression, anxiety, hallucinations, mood swings or changes, suicidal or homicidal thoughts.     Physical examination:   Vitals:    01/21/22 1525   BP: 118/76   BP Location: Right arm   Patient Position: Sitting   BP Cuff Size: Adult   Weight: 92.8 kg (204 lb 9.4 oz)   Height: 1.6 m (5' 3\")     General: Patient in no acute distress, pleasant and cooperative.  HEENT: Normocephalic, no signs of acute trauma.   Neck: supple, no meningeal signs or carotid bruits. There is normal range of motion. No tenderness on exam.   Chest: clear to auscultation. No cough.   CV: RRR, no murmurs.   Abdomen: soft, non distended or tender.   Skin: no signs of acute rashes or trauma.   Musculoskeletal: joints exhibit full range of motion, without any pain to palpation. There are no signs of joint or muscle swelling. There is no tenderness to deep palpation of muscles.   Psychiatric: No hallucinatory behavior. Denies symptoms of depression or suicidal ideation. Mood and affect appear normal on exam.     NEUROLOGICAL EXAM:   Mental status, orientation: Awake, alert and fully oriented.   Speech and language: speech is clear and fluent. The patient is able to name, repeat and comprehend.   Memory: There is " intact recollection of recent and remote events.   Cranial nerve exam: Pupils are 3-4 mm bilaterally and equally reactive to light and accommodation. Visual fields are intact by confrontation. There is no nystagmus on primary or secondary gaze. Intact full EOM in all directions of gaze. Face appears symmetric. Sensation in the face is intact to light touch. Uvula is midline. Palate elevates symmetrically. Tongue is midline and without any signs of tongue biting or fasciculations.Sternocleidomastoid muscles exhibit is normal strength bilaterally. Shoulder shrug is intact bilaterally.   Motor exam: Strength is 5/5 in all extremities. Tone is normal. No abnormal movements were seen on exam.   Sensory exam reveals normal sense of light touch, proprioception, vibration and pinprick in all extremities.   Deep tendon reflexes:  2+ throughout. Plantar responses are flexor. There is no clonus.   Coordination: shows a normal finger-nose-finger. Normal rapidly alternating movements.   Gait: The patient was able to get up from seated position on first attempt without requiring assistance. Found to be steady when walking. Movements were fluid with normal arm swing. The patient was able to turn without difficulties or tendency to fall. Romberg exam negative.        ANCILLARY DATA REVIEWED:       Lab Data Review:  Results for PEDRITO SALTER (MRN [id number]) as of 1/21/2022 15:52   Ref. Range 10/20/2020 07:00 10/20/2020 17:15   Sodium Latest Ref Range: 135 - 145 mmol/L 137    Potassium Latest Ref Range: 3.6 - 5.5 mmol/L 4.5    Chloride Latest Ref Range: 96 - 112 mmol/L 103    Co2 Latest Ref Range: 20 - 33 mmol/L 21    Anion Gap Latest Ref Range: 7.0 - 16.0  13.0    Glucose Latest Ref Range: 65 - 99 mg/dL 142 (H)    Bun Latest Ref Range: 8 - 22 mg/dL 8    Creatinine Latest Ref Range: 0.50 - 1.40 mg/dL 0.57    GFR If  Latest Ref Range: >60 mL/min/1.73 m 2 >60    GFR If Non  Latest Ref Range: >60  mL/min/1.73 m 2 >60    Calcium Latest Ref Range: 8.5 - 10.5 mg/dL 9.2    AST(SGOT) Latest Ref Range: 12 - 45 U/L 42    ALT(SGPT) Latest Ref Range: 2 - 50 U/L 87 (H)    Alkaline Phosphatase Latest Ref Range: 30 - 99 U/L 110 (H)    Total Bilirubin Latest Ref Range: 0.1 - 1.5 mg/dL 0.3    Albumin Latest Ref Range: 3.2 - 4.9 g/dL 4.4    Total Protein Latest Ref Range: 6.0 - 8.2 g/dL 7.5    Globulin Latest Ref Range: 1.9 - 3.5 g/dL 3.1    A-G Ratio Latest Units: g/dL 1.4      Results for PEDRITO SALTER (MRN [id number]) as of 1/21/2022 15:52   Ref. Range 10/21/2020 07:59   WBC Latest Ref Range: 4.8 - 10.8 K/uL 15.1 (H)   RBC Latest Ref Range: 4.20 - 5.40 M/uL 4.57   Hemoglobin Latest Ref Range: 12.0 - 16.0 g/dL 13.1   Hematocrit Latest Ref Range: 37.0 - 47.0 % 39.5   MCV Latest Ref Range: 81.4 - 97.8 fL 86.4   MCH Latest Ref Range: 27.0 - 33.0 pg 28.7   MCHC Latest Ref Range: 33.6 - 35.0 g/dL 33.2 (L)   RDW Latest Ref Range: 35.9 - 50.0 fL 41.0   Platelet Count Latest Ref Range: 164 - 446 K/uL 296   MPV Latest Ref Range: 9.0 - 12.9 fL 11.1   Results for PEDRITO SALTER (MRN [id number]) as of 1/21/2022 15:52   Ref. Range 10/20/2020 08:50   25-Hydroxy   Vitamin D 25 Latest Ref Range: 30 - 100 ng/mL 42       Records reviewed:       Imaging:   10/20/2020  IMPRESSION:     1.  There are multifocal abnormal T2 hyperintensities in the subcortical and periventricular white matter consistent with chronic demyelinating plaques of multiple sclerosis. There has been no significant interval change.  2.  There is an approximately 6 mm sized stable calcification noted along the cortical surface of the left occipital lobe.This may represent chronic sequelae of granulomatous infection.  3.  There has been no significant change.    Cervical 10/20/2020  IMPRESSION:     Contrast enhanced MRI of the cervical spine demonstrates no cord signal abnormality or stenosis    EEG: 10/20/2020  INTERPRETATION:  This is a normal video EEG recording  in the awake and drowsy/sleep state(s).  No epileptiform discharges or seizures were seen. Clinical correlation is recommended.     Note: This EEG does not rule out epilepsy.  If the clinical suspicion remains high for seizures, a prolonged recording to capture clinical or subclinical events may be helpful.    ASSESSMENT AND PLAN:  1. Seizures (HCC)  - levETIRAcetam (KEPPRA) 500 MG Tab; Take 1 Tablet by mouth 2 times a day.  Dispense: 60 Tablet; Refill: 11  - VITAMIN D,25 HYDROXY; Future       FOLLOW-UP:   April with Dr. Hatch.      EDUCATION AND COUNSELING:  -Education was provided to the patient and/or family regarding diagnosis and prognosis. The chronic and unpredictable nature of the condition were discussed. There is increased risk for additional events, which may carry potential for significant injuries and death. Discussed frequent seizure triggers: sleep deprivation, medication non-compliance, use of illegal drugs/alcohol, stress, and others.   -We reviewed in detail the current antiepileptic regimen. Potential side effects of antiepileptics were discussed at length, including but no limited to: hypersensitivity reactions (rash and others, some of which can be fatal), visual field changes (some of which may be irreversible), glaucoma, diplopia, kidney stones, osteopenia/osteoporosis/bone fractures, hyperthermia/anhydrosis, hyponatremia, tremors/abnormal movements, ataxia, dizziness, fatigue, increased risk for falls, risk for cardiac arrhythmias and syncope, weight changes, hair loss, gastrointestinal side effects(hepatitis, pancreatitis, gastritis, ulcers, gingival hypertrophy/bleeding, drowsiness, sedation, memory loss, trouble finding words, anxiety/nervousness, increased risk for suicide, increased risk for depression, and psychosis.   -We also reviewed drug-drug interactions and their potential effect on seizure control and medication side effects.    -We also discussed in detail potential effects of  seizures, epilepsy, and medications during pregnancy, including but not limited to fetal malformations, child developmental/intellectual disability, fetal/ risk for hemorrhages, stillbirth, maternal death, premature birth, and others. The patient/family aware that pregnancy should be avoided, unless desired, in which case we recommend discussing with us at least a year prior to planned conception. To avoid undesired pregnancy while on antiepileptics, we recommend dual contraception.   -Folic acid 2 mg is recommended for all females in childbearing age (12-44 years of age).   -Recommend chronic vitamin D supplementation and regular exercise (if not contraindicated).   -Patient/family educated on risk for SUDEP (Sudden Death in Epilepsy). Counseling was provided on the importance of strict medication and follow up compliance. The patient/family understand the risks associated with non-adherence with the medical plan as outlined, including but not limited to an increased risk for breakthrough seizures, which may contribute to injuries, disability, status epilepticus, and even death.   -Counseling was also provided on potential effects of alcohol and other drugs, which may lower seizure threshold and/or affect the metabolism of antiepileptic drugs. We recommend avoidance of alcohol and illegal drugs.  -Avoid sleep deprivation.   -We extensively discussed the aspects related to safety in drivers who suffer from epilepsy. The patient is encourage to report to the Division of Motor Vehicles of any condition and/or spells related to confusion, disorientation, and/or loss of awareness and/or loss of consciousness; as these may pose a safety issue if they occur while operating a motor vehicle. The patient and/or family are ultimately responsible for exercising caution and abiding to regulations in place. The patient understands that only the Department of Motor Vehicles (DMV) is able to authorize an individual to  drive, even after medical clearance, DMV clearance must be obtained.   -Other seizure precautions were discussed at length, including no diving, no skydiving, no climbing or exposure to unprotected heights, no unsupervised swimming, no Jacuzzi or bathing in bathtubs or deep bodies of water. The patient/family have been advised about risks for operating any machinery while suffering from seizures / syncope / epilepsy and/or while taking antiepileptic drugs.   -The patient understands and agrees that due to the complexity of his/her diagnosis, results of any testing and further recommendations will typically be discussed/made during a face to face encounter in my office. The patient and/or family further understands it is their responsibility to keep proper follow up.     Patient/family agree with plan, as outlined.     My total time spent caring for the patient on the day of the encounter was 35 minutes.   This does not include time spent on separately billable procedures/tests.        ALVIN Brennan, BLESSING    Office: [phone]  Fax: [phone]

## 2023-10-24 ENCOUNTER — OFFICE VISIT (OUTPATIENT)
Dept: MEDICAL GROUP | Facility: PHYSICIAN GROUP | Age: 28
End: 2023-10-24

## 2023-10-24 VITALS
TEMPERATURE: 98 F | HEIGHT: 68 IN | WEIGHT: 218 LBS | OXYGEN SATURATION: 96 % | BODY MASS INDEX: 33.04 KG/M2 | DIASTOLIC BLOOD PRESSURE: 70 MMHG | SYSTOLIC BLOOD PRESSURE: 122 MMHG | HEART RATE: 70 BPM

## 2023-10-24 DIAGNOSIS — R56.9 SEIZURE (HCC): ICD-10-CM

## 2023-10-24 DIAGNOSIS — G43.109 MIGRAINE WITH AURA AND WITHOUT STATUS MIGRAINOSUS, NOT INTRACTABLE: ICD-10-CM

## 2023-10-24 DIAGNOSIS — R93.0 RADIOLOGICALLY ISOLATED SYNDROME: ICD-10-CM

## 2023-10-24 DIAGNOSIS — R73.03 PREDIABETES: ICD-10-CM

## 2023-10-24 PROCEDURE — 3074F SYST BP LT 130 MM HG: CPT | Performed by: NURSE PRACTITIONER

## 2023-10-24 PROCEDURE — 99214 OFFICE O/P EST MOD 30 MIN: CPT | Performed by: NURSE PRACTITIONER

## 2023-10-24 PROCEDURE — 3078F DIAST BP <80 MM HG: CPT | Performed by: NURSE PRACTITIONER

## 2023-10-24 RX ORDER — LEVETIRACETAM 500 MG/1
500 TABLET ORAL 2 TIMES DAILY
Qty: 60 TABLET | Refills: 11 | Status: SHIPPED | OUTPATIENT
Start: 2023-10-24

## 2023-10-24 ASSESSMENT — ENCOUNTER SYMPTOMS
VOMITING: 0
DIZZINESS: 1
CHILLS: 0
NAUSEA: 0
SHORTNESS OF BREATH: 0
WEIGHT LOSS: 0
MYALGIAS: 0
HEADACHES: 0
ABDOMINAL PAIN: 0
FEVER: 0

## 2023-10-24 ASSESSMENT — PATIENT HEALTH QUESTIONNAIRE - PHQ9: CLINICAL INTERPRETATION OF PHQ2 SCORE: 0

## 2023-10-24 NOTE — PROGRESS NOTES
Subjective:     CC: establish care and medication refill    HPI:   Rosanna is a 28 y.o. female presents to establish care. Previous PCP was JERONIMO Robles in [place]. Specialists: [facility]. Pt would like to discuss the following today:    Problem   Prediabetes    A1C was at 5.7% in 2020. Reports she is working on healthy diet and exercise.     Radiologically Isolated Syndrome    She was originally dx with MS in 08/2019 after presenting to the ER with severe headache and seizures and an MRI revealed lesions concerning for MS. She has since seen [facility] and been diagnosed with RIS (vs CIS) given her lack of relapse or clinical event suggestive of MS. Dr. Hatch did inform pt that her risk of eventually meeting criteria for MS is elevated.   Her last visit with Neurology was 04/2022.   Endorses fatigue for the past 2 weeks.   Denies weakness, numbness/tingling, lack of coordination, vision impairment/changes, walking or balance problems, bowel or bladder problems, dizziness or pain.      Seizure (Hcc)    Procedure occurred at age 13.  She was previously established with [facility] but was lost to follow-up due to lack of insurance. She has been stable on and adherent to Keppra 500mg BID. Last seizure was on 10/21/2020. Advised by neurology to continue Keppra 500mg BID.     Migraine With Aura    Chronic. Reports 1-2 migraines per month. Endorses aural symptoms (spots in her R visual field).  Current abortive therapy includes Excedrin Migraine, which provides adequate relief.  She reports in cases in which her migraines last she reports that severe migraines that are not responsive to abortive therapy are usually indicative of an impending seizure.          Health Maintenance/Immunizations: Declined currently due to lack of insurance    Current Outpatient Medications   Medication Sig Dispense Refill    levETIRAcetam (KEPPRA) 500 MG Tab Take 1 Tablet by mouth 2 times a day. 60 Tablet 11    Cholecalciferol  "(VITAMIN D3) 125 MCG (5000 UT) Cap Take 1 Cap by mouth every morning. 30 Cap 11    asa/apap/caffeine (EXCEDRIN) 250-250-65 MG Tab Take 2 Tabs by mouth every 6 hours as needed for Headache.       No current facility-administered medications for this visit.     Social History     Tobacco Use    Smoking status: Never    Smokeless tobacco: Never   Vaping Use    Vaping Use: Never used   Substance Use Topics    Alcohol use: No    Drug use: No        Review of Systems   Constitutional:  Positive for malaise/fatigue. Negative for chills, fever and weight loss.   Respiratory:  Negative for shortness of breath.    Cardiovascular:  Negative for chest pain.   Gastrointestinal:  Negative for abdominal pain, nausea and vomiting.   Musculoskeletal:  Negative for myalgias.   Skin:  Negative for rash.   Neurological:  Positive for dizziness. Negative for headaches.        Objective:     /70 (BP Location: Right arm, Patient Position: Sitting, BP Cuff Size: Adult)   Pulse 70   Temp 36.7 °C (98 °F) (Temporal)   Ht 1.727 m (5' 8\")   Wt 98.9 kg (218 lb)   SpO2 96%   BMI 33.15 kg/m²  Body mass index is 33.15 kg/m².     Physical Exam  Constitutional:       Appearance: Normal appearance.   HENT:      Right Ear: Tympanic membrane normal.      Left Ear: Tympanic membrane normal.   Eyes:      Extraocular Movements: Extraocular movements intact.      Conjunctiva/sclera: Conjunctivae normal.      Pupils: Pupils are equal, round, and reactive to light.   Cardiovascular:      Rate and Rhythm: Normal rate and regular rhythm.      Heart sounds: Normal heart sounds. No murmur heard.  Pulmonary:      Effort: Pulmonary effort is normal. No respiratory distress.      Breath sounds: Normal breath sounds.   Lymphadenopathy:      Cervical: No cervical adenopathy.   Skin:     General: Skin is warm and dry.   Neurological:      General: No focal deficit present.      Mental Status: She is alert and oriented to person, place, and time.      " Cranial Nerves: Cranial nerves 2-12 are intact.      Motor: No weakness.      Gait: Gait is intact.   Psychiatric:         Mood and Affect: Mood normal.         Behavior: Behavior normal.          Assessment and Plan:   28 y.o. female with the following -    1. Radiologically isolated syndrome  Chronic, symptomatically stable. I did recommend she return to neurology for continued monitoring.  She declined this referral due to lack of insurance.  Discussed concerning symptoms to look out for.    2. Seizure (HCC)  Chronic, stable.  She has been seizure-free since 2020. Continue current regimen as listed below.   - Comp Metabolic Panel; Future  - CBC WITH DIFFERENTIAL; Future  - levETIRAcetam (KEPPRA) 500 MG Tab; Take 1 Tablet by mouth 2 times a day.  Dispense: 60 Tablet; Refill: 11    3. Migraine with aura and without status migrainosus, not intractable  Chronic, stable. Continue with Excedrin migraine as abortive therapy.    4. Prediabetes  Chronic.  We will order a CMP in which we will evaluate her blood glucose.  Recommended a healthy diet and regular exercise.    Return in about 6 months (around 4/24/2024) for seizures follow up.    Please note that this dictation was created using voice recognition software. I have made every reasonable attempt to correct obvious errors, but I expect that there are errors of grammar and possibly content that I did not discover before finalizing the note.

## 2024-05-01 ENCOUNTER — TELEPHONE (OUTPATIENT)
Dept: HEALTH INFORMATION MANAGEMENT | Facility: OTHER | Age: 29
End: 2024-05-01